# Patient Record
Sex: MALE | Race: WHITE | NOT HISPANIC OR LATINO | Employment: OTHER | URBAN - METROPOLITAN AREA
[De-identification: names, ages, dates, MRNs, and addresses within clinical notes are randomized per-mention and may not be internally consistent; named-entity substitution may affect disease eponyms.]

---

## 2022-07-03 ENCOUNTER — HOSPITAL ENCOUNTER (EMERGENCY)
Facility: HOSPITAL | Age: 57
Discharge: HOME | End: 2022-07-03
Attending: FAMILY MEDICINE
Payer: MEDICAID

## 2022-07-03 VITALS
RESPIRATION RATE: 18 BRPM | BODY MASS INDEX: 25.05 KG/M2 | HEART RATE: 74 BPM | TEMPERATURE: 98 F | OXYGEN SATURATION: 97 % | SYSTOLIC BLOOD PRESSURE: 157 MMHG | WEIGHT: 175 LBS | HEIGHT: 70 IN | DIASTOLIC BLOOD PRESSURE: 108 MMHG

## 2022-07-03 DIAGNOSIS — R10.9 FLANK PAIN: Primary | ICD-10-CM

## 2022-07-03 DIAGNOSIS — N20.0 NEPHROLITHIASIS: ICD-10-CM

## 2022-07-03 LAB
ALBUMIN SERPL-MCNC: 4.1 GM/DL (ref 3.5–5)
ALBUMIN/GLOB SERPL: 1.5 RATIO (ref 1.1–2)
ALP SERPL-CCNC: 53 UNIT/L (ref 40–150)
ALT SERPL-CCNC: 12 UNIT/L (ref 0–55)
APPEARANCE UR: CLEAR
AST SERPL-CCNC: 15 UNIT/L (ref 5–34)
BACTERIA #/AREA URNS AUTO: ABNORMAL /HPF
BASOPHILS # BLD AUTO: 0.05 X10(3)/MCL (ref 0–0.2)
BASOPHILS NFR BLD AUTO: 0.5 %
BILIRUB UR QL STRIP.AUTO: NEGATIVE MG/DL
BILIRUBIN DIRECT+TOT PNL SERPL-MCNC: 0.5 MG/DL
BUN SERPL-MCNC: 19.5 MG/DL (ref 8.4–25.7)
CALCIUM SERPL-MCNC: 9.7 MG/DL (ref 8.4–10.2)
CHLORIDE SERPL-SCNC: 107 MMOL/L (ref 98–107)
CO2 SERPL-SCNC: 28 MMOL/L (ref 22–29)
COLOR UR AUTO: ABNORMAL
CREAT SERPL-MCNC: 0.88 MG/DL (ref 0.73–1.18)
EOSINOPHIL # BLD AUTO: 0.23 X10(3)/MCL (ref 0–0.9)
EOSINOPHIL NFR BLD AUTO: 2.5 %
ERYTHROCYTE [DISTWIDTH] IN BLOOD BY AUTOMATED COUNT: 12.4 % (ref 11.5–17)
GLOBULIN SER-MCNC: 2.8 GM/DL (ref 2.4–3.5)
GLUCOSE SERPL-MCNC: 102 MG/DL (ref 74–100)
GLUCOSE UR QL STRIP.AUTO: NORMAL MG/DL
HCT VFR BLD AUTO: 45.9 % (ref 42–52)
HGB BLD-MCNC: 15.1 GM/DL (ref 14–18)
HYALINE CASTS #/AREA URNS LPF: ABNORMAL /LPF
IMM GRANULOCYTES # BLD AUTO: 0.03 X10(3)/MCL (ref 0–0.04)
IMM GRANULOCYTES NFR BLD AUTO: 0.3 %
KETONES UR QL STRIP.AUTO: NEGATIVE MG/DL
LEUKOCYTE ESTERASE UR QL STRIP.AUTO: NEGATIVE UNIT/L
LYMPHOCYTES # BLD AUTO: 1.65 X10(3)/MCL (ref 0.6–4.6)
LYMPHOCYTES NFR BLD AUTO: 18 %
MCH RBC QN AUTO: 28.8 PG (ref 27–31)
MCHC RBC AUTO-ENTMCNC: 32.9 MG/DL (ref 33–36)
MCV RBC AUTO: 87.4 FL (ref 80–94)
MONOCYTES # BLD AUTO: 0.57 X10(3)/MCL (ref 0.1–1.3)
MONOCYTES NFR BLD AUTO: 6.2 %
NEUTROPHILS # BLD AUTO: 6.7 X10(3)/MCL (ref 2.1–9.2)
NEUTROPHILS NFR BLD AUTO: 72.5 %
NITRITE UR QL STRIP.AUTO: NEGATIVE
NRBC BLD AUTO-RTO: 0 %
PH UR STRIP.AUTO: 6.5 [PH]
PLATELET # BLD AUTO: 234 X10(3)/MCL (ref 130–400)
PMV BLD AUTO: 10 FL (ref 7.4–10.4)
POTASSIUM SERPL-SCNC: 4.4 MMOL/L (ref 3.5–5.1)
PROT SERPL-MCNC: 6.9 GM/DL (ref 6.4–8.3)
PROT UR QL STRIP.AUTO: NEGATIVE MG/DL
RBC # BLD AUTO: 5.25 X10(6)/MCL (ref 4.7–6.1)
RBC #/AREA URNS AUTO: ABNORMAL /HPF
RBC UR QL AUTO: ABNORMAL UNIT/L
SODIUM SERPL-SCNC: 143 MMOL/L (ref 136–145)
SP GR UR STRIP.AUTO: 1.02
SQUAMOUS #/AREA URNS LPF: ABNORMAL /HPF
UROBILINOGEN UR STRIP-ACNC: NORMAL MG/DL
WBC # SPEC AUTO: 9.2 X10(3)/MCL (ref 4.5–11.5)
WBC #/AREA URNS AUTO: ABNORMAL /HPF

## 2022-07-03 PROCEDURE — 36415 COLL VENOUS BLD VENIPUNCTURE: CPT | Performed by: FAMILY MEDICINE

## 2022-07-03 PROCEDURE — 63600175 PHARM REV CODE 636 W HCPCS: Performed by: FAMILY MEDICINE

## 2022-07-03 PROCEDURE — 80053 COMPREHEN METABOLIC PANEL: CPT | Performed by: FAMILY MEDICINE

## 2022-07-03 PROCEDURE — 85025 COMPLETE CBC W/AUTO DIFF WBC: CPT | Performed by: FAMILY MEDICINE

## 2022-07-03 PROCEDURE — 25000003 PHARM REV CODE 250: Performed by: FAMILY MEDICINE

## 2022-07-03 PROCEDURE — 96374 THER/PROPH/DIAG INJ IV PUSH: CPT

## 2022-07-03 PROCEDURE — 96361 HYDRATE IV INFUSION ADD-ON: CPT

## 2022-07-03 PROCEDURE — 99285 EMERGENCY DEPT VISIT HI MDM: CPT | Mod: 25

## 2022-07-03 PROCEDURE — 81001 URINALYSIS AUTO W/SCOPE: CPT | Performed by: FAMILY MEDICINE

## 2022-07-03 RX ORDER — KETOROLAC TROMETHAMINE 30 MG/ML
30 INJECTION, SOLUTION INTRAMUSCULAR; INTRAVENOUS
Status: COMPLETED | OUTPATIENT
Start: 2022-07-03 | End: 2022-07-03

## 2022-07-03 RX ORDER — ONDANSETRON 4 MG/1
4 TABLET, ORALLY DISINTEGRATING ORAL EVERY 6 HOURS PRN
Qty: 10 TABLET | Refills: 0 | Status: SHIPPED | OUTPATIENT
Start: 2022-07-03 | End: 2022-07-08

## 2022-07-03 RX ORDER — BACLOFEN 10 MG/1
10 TABLET ORAL 3 TIMES DAILY
Qty: 30 TABLET | Refills: 0 | Status: ON HOLD | OUTPATIENT
Start: 2022-07-03 | End: 2022-07-13

## 2022-07-03 RX ORDER — KETOROLAC TROMETHAMINE 10 MG/1
10 TABLET, FILM COATED ORAL EVERY 6 HOURS PRN
Qty: 20 TABLET | Refills: 0 | Status: SHIPPED | OUTPATIENT
Start: 2022-07-03 | End: 2022-07-08

## 2022-07-03 RX ORDER — ONDANSETRON 4 MG/1
4 TABLET, ORALLY DISINTEGRATING ORAL EVERY 6 HOURS PRN
Qty: 10 TABLET | Refills: 0 | Status: SHIPPED | OUTPATIENT
Start: 2022-07-03 | End: 2022-07-03 | Stop reason: SDUPTHER

## 2022-07-03 RX ADMIN — SODIUM CHLORIDE 1000 ML: 9 INJECTION, SOLUTION INTRAVENOUS at 02:07

## 2022-07-03 RX ADMIN — KETOROLAC TROMETHAMINE 30 MG: 30 INJECTION, SOLUTION INTRAMUSCULAR at 02:07

## 2022-07-03 NOTE — ED PROVIDER NOTES
Encounter Date: 7/3/2022       History     Chief Complaint   Patient presents with    Flank Pain     Patient reports flank pain started this am worse with movement     A 57-year-old gentleman presents emergency room with complaints of acute onset of left flank pain that began around 720 this morning while in breakfast.  Patient reports a history of kidney stones in the past, last of that approximately 15 years prior.  Patient describes the pain as sharp/crampy pain with radiation to the left gluteal region.  No fever or chills.  Mild nausea.  No dysuria or hematuria.        Review of patient's allergies indicates:  No Known Allergies  No past medical history on file.  No past surgical history on file.  No family history on file.     Review of Systems   Constitutional: Negative for chills, fatigue and fever.   HENT: Negative for ear pain, rhinorrhea and sore throat.    Eyes: Negative for photophobia and pain.   Respiratory: Negative for cough, shortness of breath and wheezing.    Cardiovascular: Negative for chest pain.   Gastrointestinal: Negative for abdominal pain, diarrhea, nausea and vomiting.   Genitourinary: Positive for flank pain. Negative for dysuria.   Musculoskeletal: Positive for back pain.   Neurological: Negative for dizziness, weakness and headaches.   All other systems reviewed and are negative.      Physical Exam     Initial Vitals [07/03/22 1336]   BP Pulse Resp Temp SpO2   (!) 156/103 70 16 97.7 °F (36.5 °C) 99 %      MAP       --         Physical Exam    Nursing note and vitals reviewed.  Constitutional: He appears well-developed and well-nourished.   HENT:   Head: Normocephalic and atraumatic.   Eyes: EOM are normal. Pupils are equal, round, and reactive to light.   Neck: Neck supple.   Normal range of motion.  Cardiovascular: Normal rate, regular rhythm, normal heart sounds and intact distal pulses. Exam reveals no gallop and no friction rub.    No murmur heard.  Pulmonary/Chest: Breath sounds  normal. No respiratory distress.   Abdominal: Abdomen is soft. Bowel sounds are normal. He exhibits no distension. There is no abdominal tenderness.   Musculoskeletal:         General: Normal range of motion.      Cervical back: Normal range of motion and neck supple.     Neurological: He is alert and oriented to person, place, and time. He has normal strength.   Skin: Skin is warm and dry.   Psychiatric: He has a normal mood and affect. His behavior is normal. Judgment and thought content normal.         ED Course   Procedures  Labs Reviewed   COMPREHENSIVE METABOLIC PANEL - Abnormal; Notable for the following components:       Result Value    Glucose Level 102 (*)     All other components within normal limits   URINALYSIS, REFLEX TO URINE CULTURE - Abnormal; Notable for the following components:    Color, UA Light-Yellow (*)     Blood, UA Trace (*)     Squamous Epithelial Cells, UA Trace (*)     All other components within normal limits   CBC WITH DIFFERENTIAL - Abnormal; Notable for the following components:    MCHC 32.9 (*)     All other components within normal limits   CBC W/ AUTO DIFFERENTIAL    Narrative:     The following orders were created for panel order CBC Auto Differential.  Procedure                               Abnormality         Status                     ---------                               -----------         ------                     CBC with Differential[[id number]]        Abnormal            Final result                 Please view results for these tests on the individual orders.   EXTRA TUBES    Narrative:     The following orders were created for panel order EXTRA TUBES.  Procedure                               Abnormality         Status                     ---------                               -----------         ------                     Light Blue Top Hold[[id number]]                              In process                 Gold Top Hold[[id number]]                                     In process                   Please view results for these tests on the individual orders.   LIGHT BLUE TOP HOLD   GOLD TOP HOLD          Imaging Results          CT Abdomen Pelvis  Without Contrast (Final result)  Result time 07/03/22 16:35:59    Final result by Chris Avina MD (07/03/22 16:35:59)                 Impression:      Motion degraded exam.    In spite of this limitation:    1. Bilateral nonobstructive renal stones.  No hydronephrosis.  2. Cholelithiasis.  3. Colonic diverticulosis.  No diverticulitis.      Electronically signed by: Chris Avina  Date:    07/03/2022  Time:    16:35             Narrative:    EXAMINATION:  CT ABDOMEN PELVIS WITHOUT CONTRAST    CLINICAL HISTORY:  Flank pain, kidney stone suspected;    TECHNIQUE:  CT of the abdomen and pelvis WITHOUT intravenous contrast. The abdomen and pelvis were scanned utilizing a multidetector helical scanner from the diaphragm to the lesser trochanter without the administration of intravenous or oral contrast. Coronal and sagittal reformations were obtained.    Automatic exposure control was utilized to limit radiation dose.    Radiation Dose:    Total DLP: 320 mGy*cm    COMPARISON:  None.    FINDINGS:  Lack of intravenous contrast limits sensitivity for detection of solid organ and vascular pathology.    LOWER THORAX: Atelectatic changes, otherwise lung bases clear.  No pericardial or pleural effusions.    HEPATOBILIARY: Hepatic dome not captured in its entirety.  Scattered calcifications.  Right hepatic 2 cm cyst of simple fluid attenuation.  Otherwise no focal hepatic lesions.  No biliary ductal dilatation. Calcified 0.6 cm stone at the neck.    SPLEEN: Size within normal limits.  Scattered calcifications suggest prior granulomatous disease.    PANCREAS: No focal masses or ductal dilatation.    ADRENALS: No adrenal nodules.    KIDNEYS/URETERS: Few nonobstructive renal stones bilaterally, the largest is at the right lower pole measures 0.5 cm.   No hydronephrosis.  Minimal nonspecific perinephric stranding bilaterally.    PELVIC ORGANS/BLADDER: Prostatic calcifications.    PERITONEUM / RETROPERITONEUM: No free intraperitoneal air. No free fluid.    LYMPH NODES: No lymphadenopathy.    VESSELS: Scattered atherosclerotic vascular calcifications of a nonaneurysmal abdominal aorta and its iliac branches.    GI TRACT: No distention or wall thickening. Colonic diverticulosis.  No diverticulitis.  Appendix not visualized.  No pericecal inflammatory changes.    BONES AND SOFT TISSUES: Small fat containing bilateral inguinal hernias.  Otherwise soft tissues within normal limits.  Healed fracture deformity of the sternum.  No bony destructive lesions.  No acute bony pathology.                                 Medications   ketorolac injection 30 mg (30 mg Intravenous Given 7/3/22 1450)   sodium chloride 0.9% bolus 1,000 mL (0 mLs Intravenous Stopped 7/3/22 1550)                 ED Course as of 07/03/22 1656   Sun Jul 03, 2022   1651 Patient currently feeling improved.  Discussed with patient regarding findings.  Will treat symptomatically, possibly related to musculoskeletal pain.  ER precautions given for any acute worsening. [MW]      ED Course User Index  [MW] Trey Mayes MD             Clinical Impression:   Final diagnoses:  [R10.9] Flank pain (Primary)  [N20.0] Nephrolithiasis          ED Disposition Condition    Discharge Stable        ED Prescriptions     Medication Sig Dispense Start Date End Date Auth. Provider    ketorolac (TORADOL) 10 mg tablet Take 1 tablet (10 mg total) by mouth every 6 (six) hours as needed for Pain. 20 tablet 7/3/2022 7/8/2022 Trey Mayes MD    ondansetron (ZOFRAN-ODT) 4 MG TbDL  (Status: Discontinued) Take 1 tablet (4 mg total) by mouth every 6 (six) hours as needed (nausea vomiting). 10 tablet 7/3/2022 7/3/2022 Trey Mayes MD    baclofen (LIORESAL) 10 MG tablet Take 1 tablet (10 mg total) by mouth 3  (three) times daily. for 10 days 30 tablet 7/3/2022 7/13/2022 Trey Mayes MD    ondansetron (ZOFRAN-ODT) 4 MG TbDL Take 1 tablet (4 mg total) by mouth every 6 (six) hours as needed (nausea vomiting). 10 tablet 7/3/2022 7/8/2022 Trey Mayes MD        Follow-up Information     Follow up With Specialties Details Why Contact Info    [facility] - Emergency Dept Emergency Medicine  As needed, If symptoms worsen [address] [facility] [address] [phone]    Primary Care Physician  In 5 days             Trey Mayes MD  07/03/22 8069       Trey Mayes MD  07/03/22 3549

## 2022-07-06 ENCOUNTER — HOSPITAL ENCOUNTER (EMERGENCY)
Facility: HOSPITAL | Age: 57
Discharge: HOME | End: 2022-07-06
Attending: EMERGENCY MEDICINE
Payer: MEDICAID

## 2022-07-06 VITALS
DIASTOLIC BLOOD PRESSURE: 64 MMHG | HEART RATE: 71 BPM | BODY MASS INDEX: 25.18 KG/M2 | HEIGHT: 69 IN | WEIGHT: 170 LBS | TEMPERATURE: 98 F | OXYGEN SATURATION: 98 % | SYSTOLIC BLOOD PRESSURE: 123 MMHG | RESPIRATION RATE: 16 BRPM

## 2022-07-06 DIAGNOSIS — M54.9 MUSCULOSKELETAL BACK PAIN: ICD-10-CM

## 2022-07-06 DIAGNOSIS — M54.42 CHRONIC LEFT-SIDED LOW BACK PAIN WITH LEFT-SIDED SCIATICA: Primary | ICD-10-CM

## 2022-07-06 DIAGNOSIS — N20.0 RIGHT RENAL STONE: ICD-10-CM

## 2022-07-06 DIAGNOSIS — G89.29 CHRONIC LEFT-SIDED LOW BACK PAIN WITH LEFT-SIDED SCIATICA: Primary | ICD-10-CM

## 2022-07-06 PROCEDURE — 96361 HYDRATE IV INFUSION ADD-ON: CPT

## 2022-07-06 PROCEDURE — 99284 EMERGENCY DEPT VISIT MOD MDM: CPT | Mod: 25

## 2022-07-06 PROCEDURE — 96374 THER/PROPH/DIAG INJ IV PUSH: CPT

## 2022-07-06 PROCEDURE — 25000003 PHARM REV CODE 250: Performed by: NURSE PRACTITIONER

## 2022-07-06 PROCEDURE — 96375 TX/PRO/DX INJ NEW DRUG ADDON: CPT

## 2022-07-06 PROCEDURE — 63600175 PHARM REV CODE 636 W HCPCS: Performed by: NURSE PRACTITIONER

## 2022-07-06 PROCEDURE — 63600175 PHARM REV CODE 636 W HCPCS

## 2022-07-06 RX ORDER — KETOROLAC TROMETHAMINE 30 MG/ML
30 INJECTION, SOLUTION INTRAMUSCULAR; INTRAVENOUS ONCE
Status: COMPLETED | OUTPATIENT
Start: 2022-07-06 | End: 2022-07-06

## 2022-07-06 RX ORDER — CYCLOBENZAPRINE HCL 10 MG
5 TABLET ORAL 3 TIMES DAILY PRN
Qty: 8 TABLET | Refills: 0 | Status: SHIPPED | OUTPATIENT
Start: 2022-07-06 | End: 2022-07-11

## 2022-07-06 RX ORDER — KETOROLAC TROMETHAMINE 30 MG/ML
INJECTION, SOLUTION INTRAMUSCULAR; INTRAVENOUS
Status: COMPLETED
Start: 2022-07-06 | End: 2022-07-06

## 2022-07-06 RX ORDER — HYDROCODONE BITARTRATE AND ACETAMINOPHEN 7.5; 325 MG/1; MG/1
1 TABLET ORAL EVERY 6 HOURS PRN
Qty: 15 TABLET | Refills: 0 | Status: ON HOLD | OUTPATIENT
Start: 2022-07-06

## 2022-07-06 RX ORDER — SODIUM CHLORIDE 9 MG/ML
1000 INJECTION, SOLUTION INTRAVENOUS
Status: COMPLETED | OUTPATIENT
Start: 2022-07-06 | End: 2022-07-06

## 2022-07-06 RX ORDER — DEXAMETHASONE SODIUM PHOSPHATE 4 MG/ML
8 INJECTION, SOLUTION INTRA-ARTICULAR; INTRALESIONAL; INTRAMUSCULAR; INTRAVENOUS; SOFT TISSUE
Status: COMPLETED | OUTPATIENT
Start: 2022-07-06 | End: 2022-07-06

## 2022-07-06 RX ADMIN — SODIUM CHLORIDE 1000 ML: 9 INJECTION, SOLUTION INTRAVENOUS at 10:07

## 2022-07-06 RX ADMIN — DEXAMETHASONE SODIUM PHOSPHATE 8 MG: 4 INJECTION, SOLUTION INTRAMUSCULAR; INTRAVENOUS at 10:07

## 2022-07-06 RX ADMIN — KETOROLAC TROMETHAMINE 30 MG: 30 INJECTION, SOLUTION INTRAMUSCULAR; INTRAVENOUS at 11:07

## 2022-07-06 NOTE — ED PROVIDER NOTES
Encounter Date: 7/6/2022       History     Chief Complaint   Patient presents with    Back Pain     Complaining of lower left back pain radiating down left leg x3 days     Patient states left lower back pain that radiates down his left leg x 3 days. Denies any injury or trauma. States that he was recently treated for a right sided renal stone at [facility] 3 days ago. States a hx. Of chronic back pain. Denies any loss of bladder or bowel or any saddle anesthesia.         Review of patient's allergies indicates:  No Known Allergies  History reviewed. No pertinent past medical history.  History reviewed. No pertinent surgical history.  History reviewed. No pertinent family history.     Review of Systems   Constitutional: Negative.  Negative for chills and fever.   HENT: Negative.    Eyes: Negative.    Respiratory: Negative.    Cardiovascular: Negative.    Gastrointestinal: Negative.  Negative for abdominal pain, nausea and vomiting.   Endocrine: Negative.    Genitourinary: Negative.  Negative for dysuria and hematuria.   Musculoskeletal: Positive for back pain.   Skin: Negative.  Negative for rash.   Allergic/Immunologic: Negative.    Neurological: Negative.  Negative for weakness.   Hematological: Negative.    Psychiatric/Behavioral: Negative.    All other systems reviewed and are negative.      Physical Exam     Initial Vitals [07/06/22 0912]   BP Pulse Resp Temp SpO2   (!) 160/98 77 18 98.2 °F (36.8 °C) 98 %      MAP       --         Physical Exam    Nursing note and vitals reviewed.  Constitutional: He appears well-developed and well-nourished. No distress.   HENT:   Head: Normocephalic and atraumatic.   Mouth/Throat: Oropharynx is clear and moist.   Eyes: Conjunctivae and EOM are normal. Pupils are equal, round, and reactive to light.   Neck: Neck supple.   Normal range of motion.  Cardiovascular: Normal rate, regular rhythm, normal heart sounds and intact distal pulses.   Pulmonary/Chest: Breath sounds normal. No  respiratory distress.   Abdominal: Abdomen is soft. He exhibits no distension. There is no abdominal tenderness.   Musculoskeletal:         General: No edema. Normal range of motion.      Cervical back: Normal, normal range of motion and neck supple. No tenderness or bony tenderness. Normal range of motion.      Thoracic back: Normal. No tenderness or bony tenderness. Normal range of motion.      Lumbar back: Tenderness (mild left sided paravertebral tenderness to palpation. ) present. No bony tenderness. Normal range of motion.     Neurological: He is alert and oriented to person, place, and time. He has normal strength. GCS score is 15. GCS eye subscore is 4. GCS verbal subscore is 5. GCS motor subscore is 6.   Skin: Skin is warm and dry. No rash noted.   Psychiatric: He has a normal mood and affect. Thought content normal.         ED Course   Procedures  Labs Reviewed - No data to display       Imaging Results    None          Medications   0.9%  NaCl infusion (1,000 mLs Intravenous New Bag 7/6/22 1030)   ketorolac injection 30 mg (30 mg Intravenous Given 7/6/22 1130)   dexamethasone injection 8 mg (8 mg Intravenous Given 7/6/22 1030)     Medical Decision Making:   Initial Assessment:   Patient is awake, alert, nontoxic appearing, and neurovascular intact.   Differential Diagnosis:   Back pain, sciatica, renal stone  ED Management:  Reviewed patient's lab work and CT scan from 2 days ago. Labs were unremarkable. CT scan of abdomen and pelvis shows a right lower pole renal stone and bilateral non obstructing renal stones. Patient's exam and symptoms today are more inline with patient's chronic back pain and sciatica. Will treat for back pain and sciatica and have patient f/u with his PCP and urology as already referred.                      Clinical Impression:   Final diagnoses:  [M54.42, G89.29] Chronic left-sided low back pain with left-sided sciatica (Primary)  [M54.9] Musculoskeletal back pain  [N20.0] Right  renal stone          ED Disposition Condition    Discharge Stable        ED Prescriptions     Medication Sig Dispense Start Date End Date Auth. Provider    HYDROcodone-acetaminophen (NORCO) 7.5-325 mg per tablet Take 1 tablet by mouth every 6 (six) hours as needed for Pain. 15 tablet 7/6/2022  SEAN Doyle    cyclobenzaprine (FLEXERIL) 10 MG tablet Take 0.5 tablets (5 mg total) by mouth 3 (three) times daily as needed for Muscle spasms. 8 tablet 7/6/2022 7/11/2022 SEAN Doyle        Follow-up Information     Follow up With Specialties Details Why Contact Info    Casi Delgado MD  In 2 days  [address] [facility] [id number] [phone]      Geneva Hawk MD Urology In 1 week  [address] [address]  [phone]             SEAN Doyle  07/06/22 7981

## 2022-07-08 DIAGNOSIS — M54.9 BACK PAIN, UNSPECIFIED BACK LOCATION, UNSPECIFIED BACK PAIN LATERALITY, UNSPECIFIED CHRONICITY: Primary | ICD-10-CM

## 2022-07-18 DIAGNOSIS — N20.0 KIDNEY STONE ON LEFT SIDE: Primary | ICD-10-CM

## 2022-07-19 ENCOUNTER — HOSPITAL ENCOUNTER (EMERGENCY)
Facility: HOSPITAL | Age: 57
Discharge: HOME | End: 2022-07-19
Attending: STUDENT IN AN ORGANIZED HEALTH CARE EDUCATION/TRAINING PROGRAM
Payer: MEDICAID

## 2022-07-19 VITALS
DIASTOLIC BLOOD PRESSURE: 108 MMHG | SYSTOLIC BLOOD PRESSURE: 144 MMHG | TEMPERATURE: 98 F | OXYGEN SATURATION: 98 % | HEIGHT: 70 IN | HEART RATE: 84 BPM | WEIGHT: 172.63 LBS | BODY MASS INDEX: 24.71 KG/M2 | RESPIRATION RATE: 16 BRPM

## 2022-07-19 DIAGNOSIS — G89.29 CHRONIC LOW BACK PAIN WITH LEFT-SIDED SCIATICA, UNSPECIFIED BACK PAIN LATERALITY: ICD-10-CM

## 2022-07-19 DIAGNOSIS — R10.9 LEFT FLANK PAIN: Primary | ICD-10-CM

## 2022-07-19 DIAGNOSIS — M54.42 CHRONIC LOW BACK PAIN WITH LEFT-SIDED SCIATICA, UNSPECIFIED BACK PAIN LATERALITY: ICD-10-CM

## 2022-07-19 LAB
ALBUMIN SERPL-MCNC: 3.7 GM/DL (ref 3.5–5)
ALBUMIN/GLOB SERPL: 1.5 RATIO (ref 1.1–2)
ALP SERPL-CCNC: 45 UNIT/L (ref 40–150)
ALT SERPL-CCNC: 21 UNIT/L (ref 0–55)
APPEARANCE UR: CLEAR
AST SERPL-CCNC: 16 UNIT/L (ref 5–34)
BACTERIA #/AREA URNS AUTO: ABNORMAL /HPF
BASOPHILS # BLD AUTO: 0.04 X10(3)/MCL (ref 0–0.2)
BASOPHILS NFR BLD AUTO: 0.6 %
BILIRUB UR QL STRIP.AUTO: NEGATIVE MG/DL
BILIRUBIN DIRECT+TOT PNL SERPL-MCNC: 1 MG/DL
BUN SERPL-MCNC: 10.5 MG/DL (ref 8.4–25.7)
CALCIUM SERPL-MCNC: 8.7 MG/DL (ref 8.4–10.2)
CAOX CRY URNS QL MICRO: ABNORMAL /HPF
CHLORIDE SERPL-SCNC: 108 MMOL/L (ref 98–107)
CO2 SERPL-SCNC: 23 MMOL/L (ref 22–29)
COLOR UR AUTO: ABNORMAL
CREAT SERPL-MCNC: 0.74 MG/DL (ref 0.73–1.18)
EOSINOPHIL # BLD AUTO: 0.28 X10(3)/MCL (ref 0–0.9)
EOSINOPHIL NFR BLD AUTO: 3.9 %
ERYTHROCYTE [DISTWIDTH] IN BLOOD BY AUTOMATED COUNT: 12.2 % (ref 11.5–17)
GLOBULIN SER-MCNC: 2.5 GM/DL (ref 2.4–3.5)
GLUCOSE SERPL-MCNC: 102 MG/DL (ref 74–100)
GLUCOSE UR QL STRIP.AUTO: NORMAL MG/DL
HCT VFR BLD AUTO: 44.7 % (ref 42–52)
HGB BLD-MCNC: 14.6 GM/DL (ref 14–18)
HYALINE CASTS #/AREA URNS LPF: ABNORMAL /LPF
IMM GRANULOCYTES # BLD AUTO: 0.03 X10(3)/MCL (ref 0–0.04)
IMM GRANULOCYTES NFR BLD AUTO: 0.4 %
KETONES UR QL STRIP.AUTO: NEGATIVE MG/DL
LEUKOCYTE ESTERASE UR QL STRIP.AUTO: NEGATIVE UNIT/L
LYMPHOCYTES # BLD AUTO: 1.62 X10(3)/MCL (ref 0.6–4.6)
LYMPHOCYTES NFR BLD AUTO: 22.4 %
MCH RBC QN AUTO: 28.1 PG (ref 27–31)
MCHC RBC AUTO-ENTMCNC: 32.7 MG/DL (ref 33–36)
MCV RBC AUTO: 86 FL (ref 80–94)
MONOCYTES # BLD AUTO: 0.43 X10(3)/MCL (ref 0.1–1.3)
MONOCYTES NFR BLD AUTO: 6 %
MUCOUS THREADS URNS QL MICRO: ABNORMAL /LPF
NEUTROPHILS # BLD AUTO: 4.8 X10(3)/MCL (ref 2.1–9.2)
NEUTROPHILS NFR BLD AUTO: 66.7 %
NITRITE UR QL STRIP.AUTO: NEGATIVE
NRBC BLD AUTO-RTO: 0 %
PH UR STRIP.AUTO: 6 [PH]
PLATELET # BLD AUTO: 241 X10(3)/MCL (ref 130–400)
PMV BLD AUTO: 9.6 FL (ref 7.4–10.4)
POTASSIUM SERPL-SCNC: 3.7 MMOL/L (ref 3.5–5.1)
PROT SERPL-MCNC: 6.2 GM/DL (ref 6.4–8.3)
PROT UR QL STRIP.AUTO: NEGATIVE MG/DL
RBC # BLD AUTO: 5.2 X10(6)/MCL (ref 4.7–6.1)
RBC #/AREA URNS AUTO: ABNORMAL /HPF
RBC UR QL AUTO: ABNORMAL UNIT/L
SODIUM SERPL-SCNC: 138 MMOL/L (ref 136–145)
SP GR UR STRIP.AUTO: 1.02
SQUAMOUS #/AREA URNS LPF: ABNORMAL /HPF
UROBILINOGEN UR STRIP-ACNC: NORMAL MG/DL
WBC # SPEC AUTO: 7.2 X10(3)/MCL (ref 4.5–11.5)
WBC #/AREA URNS AUTO: ABNORMAL /HPF

## 2022-07-19 PROCEDURE — 36415 COLL VENOUS BLD VENIPUNCTURE: CPT | Performed by: PHYSICIAN ASSISTANT

## 2022-07-19 PROCEDURE — 81001 URINALYSIS AUTO W/SCOPE: CPT | Performed by: PHYSICIAN ASSISTANT

## 2022-07-19 PROCEDURE — 80053 COMPREHEN METABOLIC PANEL: CPT | Performed by: PHYSICIAN ASSISTANT

## 2022-07-19 PROCEDURE — 85025 COMPLETE CBC W/AUTO DIFF WBC: CPT | Performed by: PHYSICIAN ASSISTANT

## 2022-07-19 PROCEDURE — 99283 EMERGENCY DEPT VISIT LOW MDM: CPT | Mod: 25

## 2022-07-19 RX ORDER — TAMSULOSIN HYDROCHLORIDE 0.4 MG/1
0.4 CAPSULE ORAL DAILY
Qty: 30 CAPSULE | Refills: 0 | Status: SHIPPED | OUTPATIENT
Start: 2022-07-19 | End: 2022-08-01 | Stop reason: DRUGHIGH

## 2022-07-19 NOTE — ED PROVIDER NOTES
Encounter Date: 7/19/2022       History     Chief Complaint   Patient presents with    Flank Pain     Lt flank pain. Pt reports told by khurram costa he had kidney stones on the right side. Reports blood in urine.     58 yo M w/ PMHx significant for smoking, chronic back pain & recurrent nephrolithiasis presents to ED c/o ongoing flank pain w/ intermittent hematuria. Patient seen in different EDs on 7/3, 7/5 & 7/6 & discharged w/ flomax & pain meds. Reports that he has completed flomax rx, but continues to experience symptoms. Currently taking percocet for pain. Reports that he is here this AM because he wants to have kidneys checked due to the stress that is being put on them. Has not yet followed up w/ urologist. Does report radiation of pain in LLE. Denies saddle anesthesia, incontinence, bowel/bladder retention, N/V, abdominal pain, F/C, dysuria. Mildly hypertensive on arrival, vitals otherwise stable w/ NAD.        Review of patient's allergies indicates:  No Known Allergies  No past medical history on file.  Past Surgical History:   Procedure Laterality Date    APPENDECTOMY       No family history on file.  Social History     Tobacco Use    Smoking status: Current Every Day Smoker    Smokeless tobacco: Never Used     Review of Systems   Constitutional: Negative for chills, fatigue and fever.   Respiratory: Negative for cough and shortness of breath.    Cardiovascular: Negative for chest pain, palpitations and leg swelling.   Gastrointestinal: Negative for abdominal distention, abdominal pain, blood in stool, diarrhea, nausea and vomiting.   Endocrine: Negative for polydipsia, polyphagia and polyuria.   Genitourinary: Positive for flank pain and hematuria. Negative for decreased urine volume, difficulty urinating, dysuria, enuresis, frequency, genital sores, penile discharge, penile pain, scrotal swelling, testicular pain and urgency.   Musculoskeletal: Positive for back pain. Negative for arthralgias,  joint swelling and myalgias.   Skin: Negative for color change, pallor and rash.   Neurological: Negative for dizziness, syncope and headaches.   All other systems reviewed and are negative.      Physical Exam     Initial Vitals [07/19/22 0638]   BP Pulse Resp Temp SpO2   (!) 166/104 83 18 98.2 °F (36.8 °C) 97 %      MAP       --         Physical Exam    Nursing note and vitals reviewed.  Constitutional: He appears well-developed and well-nourished. He is not diaphoretic. No distress.   HENT:   Head: Normocephalic and atraumatic.   Eyes: Conjunctivae are normal. Pupils are equal, round, and reactive to light.   Neck: Neck supple.   Normal range of motion.  Cardiovascular: Normal rate, regular rhythm, normal heart sounds and intact distal pulses. Exam reveals no gallop and no friction rub.    No murmur heard.  Pulmonary/Chest: Breath sounds normal. No respiratory distress. He has no wheezes. He has no rhonchi. He has no rales.   Abdominal: Abdomen is soft. Bowel sounds are normal. He exhibits no distension and no mass. There is no abdominal tenderness. There is no rebound and no guarding.   Musculoskeletal:         General: No edema.      Cervical back: Normal, normal range of motion and neck supple.      Thoracic back: Normal.      Lumbar back: Tenderness present. No spasms or bony tenderness. Positive left straight leg raise test. Negative right straight leg raise test.      Comments: No significant CVA tenderness     Neurological: He is alert and oriented to person, place, and time. He has normal strength and normal reflexes. No cranial nerve deficit or sensory deficit.   Skin: Skin is warm and dry.   Psychiatric: He has a normal mood and affect.         ED Course   Procedures  Labs Reviewed   COMPREHENSIVE METABOLIC PANEL - Abnormal; Notable for the following components:       Result Value    Chloride 108 (*)     Glucose Level 102 (*)     Protein Total 6.2 (*)     All other components within normal limits    URINALYSIS, REFLEX TO URINE CULTURE - Abnormal; Notable for the following components:    Color, UA Light-Yellow (*)     Blood, UA Trace (*)     Mucous, UA Occ (*)     Calcium Oxalate Crystals, UA Trace (*)     All other components within normal limits   CBC WITH DIFFERENTIAL - Abnormal; Notable for the following components:    MCHC 32.7 (*)     All other components within normal limits   CBC W/ AUTO DIFFERENTIAL    Narrative:     The following orders were created for panel order CBC Auto Differential.  Procedure                               Abnormality         Status                     ---------                               -----------         ------                     CBC with Differential[[id number]]        Abnormal            Final result                 Please view results for these tests on the individual orders.   EXTRA TUBES    Narrative:     The following orders were created for panel order EXTRA TUBES.  Procedure                               Abnormality         Status                     ---------                               -----------         ------                     Light Blue Top Hold[[id number]]                              In process                 Gold Top Hold[[id number]]                                    In process                   Please view results for these tests on the individual orders.   LIGHT BLUE TOP HOLD   GOLD TOP HOLD          Imaging Results    None          Medications - No data to display  Medical Decision Making:   Clinical Tests:   Lab Tests: Ordered and Reviewed  Radiological Study: Reviewed  Today's workup wholly unremarkable. Scant occult blood on UA, but otherwise unremarkable w/ no signs of infection. Renal indices stable. CBC unremarkable. No significant CVA tenderness on physical exam. Based upon labs, physical exam & recent CT, no indication for further imaging at this time. Instructed patient to continue drinking adequate amounts of water & taking pain medicine  as directed. Will discharge w/ flomax. ED precautions given. Instructed to follow-up w/ both PCP & urology.       APC / Resident Notes:   I was not physically present during the history, exam or disposition of this patient. I was available at all times for consultation. (Jonny)                 Clinical Impression:   Final diagnoses:  [R10.9] Left flank pain (Primary)  [M54.42, G89.29] Chronic low back pain with left-sided sciatica, unspecified back pain laterality          ED Disposition Condition    Discharge Stable        ED Prescriptions     Medication Sig Dispense Start Date End Date Auth. Provider    tamsulosin (FLOMAX) 0.4 mg Cap Take 1 capsule (0.4 mg total) by mouth once daily. 30 capsule 7/19/2022 8/18/2022 YONY Ceballos        Follow-up Information     Follow up With Specialties Details Why Contact Info    Casi Delgado MD  In 1 week  [address] [facility] [address] [phone]      Urology   Keep scheduled appointment     [facility] - Emergency Dept Emergency Medicine  If symptoms worsen [address] [facility] [address] [phone]           YONY Ceballos  07/19/22 8226       Giovani Fuller MD  07/20/22 0056

## 2022-07-19 NOTE — DISCHARGE INSTRUCTIONS
Report to Emergency Department if symptoms return or worsen; [facility] - Medicine Clinic Within 1 to 2 days, It is important that you follow up with your primary care provider or specialist if indicated for further evaluation, workup, and treatment as necessary. The exam and treatment you received in Emergency Department was for an urgent problem and NOT INTENDED AS COMPLETE CARE. It is important that you FOLLOW UP with a doctor for ongoing care. If your symptoms become WORSE or you DO NOT IMPROVE and you are unable to reach your health care provider, you should RETURN to the Emergency Department. The Emergency Department provider has provided a PRELIMINARY INTERPRETATION of all your studies. A final interpretation may be done after you are discharged. If a change in your diagnosis or treatment is needed WE WILL CONTACT YOU. It is critical that we have a CURRENT PHONE NUMBER FOR YOU.

## 2022-08-01 ENCOUNTER — OFFICE VISIT (OUTPATIENT)
Dept: UROLOGY | Facility: CLINIC | Age: 57
End: 2022-08-01
Payer: MEDICAID

## 2022-08-01 VITALS
SYSTOLIC BLOOD PRESSURE: 132 MMHG | HEIGHT: 70 IN | OXYGEN SATURATION: 96 % | DIASTOLIC BLOOD PRESSURE: 97 MMHG | HEART RATE: 78 BPM | RESPIRATION RATE: 20 BRPM | WEIGHT: 164.44 LBS | TEMPERATURE: 98 F | BODY MASS INDEX: 23.54 KG/M2

## 2022-08-01 DIAGNOSIS — N20.0 KIDNEY STONE: ICD-10-CM

## 2022-08-01 DIAGNOSIS — N20.0 KIDNEY STONE ON LEFT SIDE: ICD-10-CM

## 2022-08-01 PROCEDURE — 3008F BODY MASS INDEX DOCD: CPT | Mod: CPTII,,, | Performed by: NURSE PRACTITIONER

## 2022-08-01 PROCEDURE — 3075F SYST BP GE 130 - 139MM HG: CPT | Mod: CPTII,,, | Performed by: NURSE PRACTITIONER

## 2022-08-01 PROCEDURE — 99204 OFFICE O/P NEW MOD 45 MIN: CPT | Mod: S$PBB,,, | Performed by: NURSE PRACTITIONER

## 2022-08-01 PROCEDURE — 99204 PR OFFICE/OUTPT VISIT, NEW, LEVL IV, 45-59 MIN: ICD-10-PCS | Mod: S$PBB,,, | Performed by: NURSE PRACTITIONER

## 2022-08-01 PROCEDURE — 3008F PR BODY MASS INDEX (BMI) DOCUMENTED: ICD-10-PCS | Mod: CPTII,,, | Performed by: NURSE PRACTITIONER

## 2022-08-01 PROCEDURE — 99213 OFFICE O/P EST LOW 20 MIN: CPT | Mod: PBBFAC | Performed by: NURSE PRACTITIONER

## 2022-08-01 PROCEDURE — 3075F PR MOST RECENT SYSTOLIC BLOOD PRESS GE 130-139MM HG: ICD-10-PCS | Mod: CPTII,,, | Performed by: NURSE PRACTITIONER

## 2022-08-01 PROCEDURE — 1159F PR MEDICATION LIST DOCUMENTED IN MEDICAL RECORD: ICD-10-PCS | Mod: CPTII,,, | Performed by: NURSE PRACTITIONER

## 2022-08-01 PROCEDURE — 1160F PR REVIEW ALL MEDS BY PRESCRIBER/CLIN PHARMACIST DOCUMENTED: ICD-10-PCS | Mod: CPTII,,, | Performed by: NURSE PRACTITIONER

## 2022-08-01 PROCEDURE — 1159F MED LIST DOCD IN RCRD: CPT | Mod: CPTII,,, | Performed by: NURSE PRACTITIONER

## 2022-08-01 PROCEDURE — 3080F DIAST BP >= 90 MM HG: CPT | Mod: CPTII,,, | Performed by: NURSE PRACTITIONER

## 2022-08-01 PROCEDURE — 1160F RVW MEDS BY RX/DR IN RCRD: CPT | Mod: CPTII,,, | Performed by: NURSE PRACTITIONER

## 2022-08-01 PROCEDURE — 3080F PR MOST RECENT DIASTOLIC BLOOD PRESSURE >= 90 MM HG: ICD-10-PCS | Mod: CPTII,,, | Performed by: NURSE PRACTITIONER

## 2022-08-01 RX ORDER — KETOROLAC TROMETHAMINE 10 MG/1
10 TABLET, FILM COATED ORAL EVERY 6 HOURS
Qty: 24 TABLET | Refills: 0 | Status: SHIPPED | OUTPATIENT
Start: 2022-08-01 | End: 2022-08-06

## 2022-08-01 RX ORDER — TAMSULOSIN HYDROCHLORIDE 0.4 MG/1
0.4 CAPSULE ORAL DAILY
Qty: 30 CAPSULE | Refills: 0 | Status: SHIPPED | OUTPATIENT
Start: 2022-08-01 | End: 2022-09-07 | Stop reason: SDUPTHER

## 2022-08-01 NOTE — PROGRESS NOTES
Chief Complaint:   Chief Complaint   Patient presents with    Nephrolithiasis     Left -sided       HPI:  Patient is 57-year-old male referred to Urology for kidney stones.  Patient was in the ED on 07/03/2022 diagnosis with kidney stone, placed on tamsulosin and Norco.  Today patient denies  Flank pain, groin pain, lower abdominal pain, evidence of passing stone, strainer use, patient denies urinary frequency, urgency, incontinence, retention, dysuria, gross hematuria.         Allergies:  Review of patient's allergies indicates:  No Known Allergies    Medications:  Current Outpatient Medications   Medication Sig Dispense Refill    HYDROcodone-acetaminophen (NORCO) 7.5-325 mg per tablet Take 1 tablet by mouth every 6 (six) hours as needed for Pain. 15 tablet 0    tamsulosin (FLOMAX) 0.4 mg Cap Take 1 capsule (0.4 mg total) by mouth once daily. 30 capsule 0    baclofen (LIORESAL) 10 MG tablet Take 1 tablet (10 mg total) by mouth 3 (three) times daily. for 10 days 30 tablet 0     No current facility-administered medications for this visit.       Review of Systems:  General: No fever, chills, fatigability, or weight loss.  Skin: No rashes, itching, or changes in color or texture of skin.  Chest: Denies QUIROZ, cyanosis, wheezing, cough, and sputum production.  Abdomen: Appetite fine. No weight loss. Denies diarrhea, abdominal pain, hematemesis, or blood in stool.  Musculoskeletal: No joint stiffness or swelling. Denies back pain.  : As above.  All other review of systems negative.    PMH:  Past Medical History:   Diagnosis Date    Joint pain     Unspecified urinary incontinence        PSH:  Past Surgical History:   Procedure Laterality Date    APPENDECTOMY         FamHx:  History reviewed. No pertinent family history.    SocHx:  Social History     Socioeconomic History    Marital status: Single   Tobacco Use    Smoking status: Current Every Day Smoker    Smokeless tobacco: Never Used    Tobacco comment: 1  pkg/day   Substance and Sexual Activity    Alcohol use: Not Currently    Drug use: Never    Sexual activity: Yes       Physical Exam:  Vitals:    08/01/22 0852   BP: (!) 132/97   Pulse: 78   Resp: 20   Temp: 98.2 °F (36.8 °C)     General: A&Ox3, no apparent distress, no deformities  Neck: No masses, normal thyroid  Lungs: CTA antoinette, no use of accessory muscles  Heart: RRR, no arrhythmias  Abdomen: Soft, NT, ND, no masses, no hernias, no hepatosplenomegaly  Lymphatic: Neck and groin nodes negative  Skin: The skin is warm and dry. No jaundice.  Ext: No c/c/e.        Labs:  None    Imaging:  CT- Bilateral nonobstructive renal stones.  No hydronephrosis. Cholelithiasis. Colonic diverticulosis.  No diverticulitis.         Impression:  Kidney stones      Plan: Follow-up in 6 weeks with KUB  increase fluid intake, limit salt in diet, limit protein to 30%, intake adequate calcium, increase brand, soy, Beets, nuts. Instructed patient on Avoiding bladder irritants, such as alcohol, citrus drinks or foods,salty foods, energy drinks, spicy foods, caffeine, sodas.

## 2022-08-01 NOTE — PROGRESS NOTES
Pt seen by ARON Patterson; Medications: Flomax & Toradol sent to pt preferred pharmacy; KUB ordered & pt given central scheduling information sheet; Pt instructed to return to clinic in 2 months w/LORNE; Discharge paperwork given w/pt verbalizing understanding

## 2022-08-16 DIAGNOSIS — N20.0 KIDNEY STONE: Primary | ICD-10-CM

## 2022-08-31 ENCOUNTER — HOSPITAL ENCOUNTER (OUTPATIENT)
Dept: RADIOLOGY | Facility: HOSPITAL | Age: 57
Discharge: HOME | End: 2022-08-31
Attending: NURSE PRACTITIONER
Payer: MEDICAID

## 2022-08-31 DIAGNOSIS — N20.0 KIDNEY STONE: ICD-10-CM

## 2022-08-31 PROCEDURE — 74018 RADEX ABDOMEN 1 VIEW: CPT | Mod: TC

## 2022-09-06 NOTE — PROGRESS NOTES
Chief Complaint: No chief complaint on file.      HPI:  Patient is a 57-year-old male here for 6 week follow-up kidney stones with KUB.  Today patient denies flank pain, groin pain, lower abdominal pain, evidence of passing stone, urinary frequency, urgency, incontinence, retention, dysuria, gross hematuria. Denies family history of stones, or urology pathology.           Allergies:  Review of patient's allergies indicates:  No Known Allergies    Medications:  Current Outpatient Medications   Medication Sig Dispense Refill    baclofen (LIORESAL) 10 MG tablet Take 1 tablet (10 mg total) by mouth 3 (three) times daily. for 10 days 30 tablet 0    HYDROcodone-acetaminophen (NORCO) 7.5-325 mg per tablet Take 1 tablet by mouth every 6 (six) hours as needed for Pain. 15 tablet 0    tamsulosin (FLOMAX) 0.4 mg Cap Take 1 capsule (0.4 mg total) by mouth once daily. 30 capsule 0     No current facility-administered medications for this visit.       Review of Systems:  General: No fever, chills, fatigability, or weight loss.  Skin: No rashes, itching, or changes in color or texture of skin.  Chest: Denies QUIROZ, cyanosis, wheezing, cough, and sputum production.  Abdomen: Appetite fine. No weight loss. Denies diarrhea, abdominal pain, hematemesis, or blood in stool.  Musculoskeletal: No joint stiffness or swelling. Denies back pain.  : As above.  All other review of systems negative.    PMH:  Past Medical History:   Diagnosis Date    Joint pain     Unspecified urinary incontinence        PSH:  Past Surgical History:   Procedure Laterality Date    APPENDECTOMY         FamHx:  No family history on file.    SocHx:  Social History     Socioeconomic History    Marital status: Single   Tobacco Use    Smoking status: Every Day    Smokeless tobacco: Never    Tobacco comments:     1 pkg/day   Substance and Sexual Activity    Alcohol use: Not Currently    Drug use: Never    Sexual activity: Yes       Physical Exam:  There were no vitals  filed for this visit.  General: A&Ox3, no apparent distress, no deformities  Neck: No masses, normal thyroid  Lungs: CTA antoinette, no use of accessory muscles  Heart: RRR, no arrhythmias  Abdomen: Soft, NT, ND, no masses, no hernias, no hepatosplenomegaly  Lymphatic: Neck and groin nodes negative  Skin: The skin is warm and dry. No jaundice.  Ext: No c/c/e.          Imaging:  KUB performed on 08/16/2022 revealed: Redemonstrated bilateral nephrolithiasis in splenic calcified granulomas. No convincing radiodense distal ureterolithiasis or evidence of acute abdominal process.      Impression:  Kidney stones    Plan:  Discussed KUB results with patient with actual visual display of X-Ray, patient verbalized understanding.  Continued Flomax X 1 month. Stone surveillance, Renal US, KUB, F/U 6  months.  Increase fluid intake, limit salt in diet, limit protein to 30%, intake adequate calcium, increase brand, soy, Beets, nuts. Instructed patient on Avoiding bladder irritants, such as alcohol, citrus drinks or foods,salty foods, energy drinks, spicy foods, caffeine, sodas.

## 2022-09-07 ENCOUNTER — OFFICE VISIT (OUTPATIENT)
Dept: UROLOGY | Facility: CLINIC | Age: 57
End: 2022-09-07
Payer: MEDICAID

## 2022-09-07 VITALS
BODY MASS INDEX: 24.37 KG/M2 | HEART RATE: 74 BPM | TEMPERATURE: 98 F | DIASTOLIC BLOOD PRESSURE: 91 MMHG | RESPIRATION RATE: 20 BRPM | WEIGHT: 170.19 LBS | HEIGHT: 70 IN | OXYGEN SATURATION: 97 % | SYSTOLIC BLOOD PRESSURE: 136 MMHG

## 2022-09-07 DIAGNOSIS — N20.0 KIDNEY STONE ON LEFT SIDE: ICD-10-CM

## 2022-09-07 PROCEDURE — 3075F SYST BP GE 130 - 139MM HG: CPT | Mod: CPTII,,, | Performed by: NURSE PRACTITIONER

## 2022-09-07 PROCEDURE — 3008F BODY MASS INDEX DOCD: CPT | Mod: CPTII,,, | Performed by: NURSE PRACTITIONER

## 2022-09-07 PROCEDURE — 1160F PR REVIEW ALL MEDS BY PRESCRIBER/CLIN PHARMACIST DOCUMENTED: ICD-10-PCS | Mod: CPTII,,, | Performed by: NURSE PRACTITIONER

## 2022-09-07 PROCEDURE — 1160F RVW MEDS BY RX/DR IN RCRD: CPT | Mod: CPTII,,, | Performed by: NURSE PRACTITIONER

## 2022-09-07 PROCEDURE — 3080F PR MOST RECENT DIASTOLIC BLOOD PRESSURE >= 90 MM HG: ICD-10-PCS | Mod: CPTII,,, | Performed by: NURSE PRACTITIONER

## 2022-09-07 PROCEDURE — 3075F PR MOST RECENT SYSTOLIC BLOOD PRESS GE 130-139MM HG: ICD-10-PCS | Mod: CPTII,,, | Performed by: NURSE PRACTITIONER

## 2022-09-07 PROCEDURE — 3008F PR BODY MASS INDEX (BMI) DOCUMENTED: ICD-10-PCS | Mod: CPTII,,, | Performed by: NURSE PRACTITIONER

## 2022-09-07 PROCEDURE — 99214 OFFICE O/P EST MOD 30 MIN: CPT | Mod: PBBFAC | Performed by: NURSE PRACTITIONER

## 2022-09-07 PROCEDURE — 1159F MED LIST DOCD IN RCRD: CPT | Mod: CPTII,,, | Performed by: NURSE PRACTITIONER

## 2022-09-07 PROCEDURE — 99213 PR OFFICE/OUTPT VISIT, EST, LEVL III, 20-29 MIN: ICD-10-PCS | Mod: S$PBB,,, | Performed by: NURSE PRACTITIONER

## 2022-09-07 PROCEDURE — 99213 OFFICE O/P EST LOW 20 MIN: CPT | Mod: S$PBB,,, | Performed by: NURSE PRACTITIONER

## 2022-09-07 PROCEDURE — 3080F DIAST BP >= 90 MM HG: CPT | Mod: CPTII,,, | Performed by: NURSE PRACTITIONER

## 2022-09-07 PROCEDURE — 1159F PR MEDICATION LIST DOCUMENTED IN MEDICAL RECORD: ICD-10-PCS | Mod: CPTII,,, | Performed by: NURSE PRACTITIONER

## 2022-09-07 RX ORDER — TAMSULOSIN HYDROCHLORIDE 0.4 MG/1
0.4 CAPSULE ORAL DAILY
Qty: 30 CAPSULE | Refills: 0 | Status: SHIPPED | OUTPATIENT
Start: 2022-09-07 | End: 2022-09-26

## 2023-02-27 DIAGNOSIS — N20.0 KIDNEY STONE: Primary | ICD-10-CM

## 2023-03-09 ENCOUNTER — HOSPITAL ENCOUNTER (OUTPATIENT)
Dept: RADIOLOGY | Facility: HOSPITAL | Age: 58
Discharge: HOME | End: 2023-03-09
Attending: NURSE PRACTITIONER
Payer: MEDICAID

## 2023-03-09 DIAGNOSIS — N20.0 KIDNEY STONE: ICD-10-CM

## 2023-03-09 PROCEDURE — 76775 US EXAM ABDO BACK WALL LIM: CPT | Mod: TC

## 2023-03-13 ENCOUNTER — TELEPHONE (OUTPATIENT)
Dept: UROLOGY | Facility: CLINIC | Age: 58
End: 2023-03-13
Payer: MEDICAID

## 2023-03-13 NOTE — TELEPHONE ENCOUNTER
Patient called to confirm appointment for Urology on 03/15/2023. Appt confirmed. Patient informed of labs that need to be done prior to appointment. Voiced understanding.

## 2023-03-15 ENCOUNTER — OFFICE VISIT (OUTPATIENT)
Dept: UROLOGY | Facility: CLINIC | Age: 58
End: 2023-03-15
Payer: MEDICAID

## 2023-03-15 VITALS
BODY MASS INDEX: 23.8 KG/M2 | WEIGHT: 166.25 LBS | DIASTOLIC BLOOD PRESSURE: 90 MMHG | HEART RATE: 88 BPM | RESPIRATION RATE: 20 BRPM | SYSTOLIC BLOOD PRESSURE: 139 MMHG | HEIGHT: 70 IN | TEMPERATURE: 98 F | OXYGEN SATURATION: 97 %

## 2023-03-15 DIAGNOSIS — N20.0 KIDNEY STONE: Primary | ICD-10-CM

## 2023-03-15 PROCEDURE — 99214 OFFICE O/P EST MOD 30 MIN: CPT | Mod: PBBFAC | Performed by: NURSE PRACTITIONER

## 2023-03-15 PROCEDURE — 3075F SYST BP GE 130 - 139MM HG: CPT | Mod: CPTII,,, | Performed by: NURSE PRACTITIONER

## 2023-03-15 PROCEDURE — 99213 PR OFFICE/OUTPT VISIT, EST, LEVL III, 20-29 MIN: ICD-10-PCS | Mod: S$PBB,,, | Performed by: NURSE PRACTITIONER

## 2023-03-15 PROCEDURE — 3080F PR MOST RECENT DIASTOLIC BLOOD PRESSURE >= 90 MM HG: ICD-10-PCS | Mod: CPTII,,, | Performed by: NURSE PRACTITIONER

## 2023-03-15 PROCEDURE — 1159F PR MEDICATION LIST DOCUMENTED IN MEDICAL RECORD: ICD-10-PCS | Mod: CPTII,,, | Performed by: NURSE PRACTITIONER

## 2023-03-15 PROCEDURE — 99213 OFFICE O/P EST LOW 20 MIN: CPT | Mod: S$PBB,,, | Performed by: NURSE PRACTITIONER

## 2023-03-15 PROCEDURE — 3008F BODY MASS INDEX DOCD: CPT | Mod: CPTII,,, | Performed by: NURSE PRACTITIONER

## 2023-03-15 PROCEDURE — 3080F DIAST BP >= 90 MM HG: CPT | Mod: CPTII,,, | Performed by: NURSE PRACTITIONER

## 2023-03-15 PROCEDURE — 1159F MED LIST DOCD IN RCRD: CPT | Mod: CPTII,,, | Performed by: NURSE PRACTITIONER

## 2023-03-15 PROCEDURE — 3075F PR MOST RECENT SYSTOLIC BLOOD PRESS GE 130-139MM HG: ICD-10-PCS | Mod: CPTII,,, | Performed by: NURSE PRACTITIONER

## 2023-03-15 PROCEDURE — 3008F PR BODY MASS INDEX (BMI) DOCUMENTED: ICD-10-PCS | Mod: CPTII,,, | Performed by: NURSE PRACTITIONER

## 2023-03-15 NOTE — PROGRESS NOTES
Chief Complaint: No chief complaint on file.      HPI:  Patient is a 57-year-old male here for 6 Month follow-up kidney stones with KUB.  Today patient denies flank pain, groin pain, lower abdominal pain, evidence of passing stone, urinary frequency, urgency, incontinence, retention, dysuria, gross hematuria.  Renal ultrasound as listed below.    Allergies:  Review of patient's allergies indicates:  No Known Allergies    Medications:  Current Outpatient Medications   Medication Sig Dispense Refill    HYDROcodone-acetaminophen (NORCO) 7.5-325 mg per tablet Take 1 tablet by mouth every 6 (six) hours as needed for Pain. 15 tablet 0    tamsulosin (FLOMAX) 0.4 mg Cap TAKE 1 CAPSULE(0.4 MG) BY MOUTH EVERY DAY 30 capsule 0    baclofen (LIORESAL) 10 MG tablet Take 1 tablet (10 mg total) by mouth 3 (three) times daily. for 10 days 30 tablet 0     No current facility-administered medications for this visit.       Review of Systems:  General: No fever, chills, fatigability, or weight loss.  Skin: No rashes, itching, or changes in color or texture of skin.  Chest: Denies QUIROZ, cyanosis, wheezing, cough, and sputum production.  Abdomen: Appetite fine. No weight loss. Denies diarrhea, abdominal pain, hematemesis, or blood in stool.  Musculoskeletal: No joint stiffness or swelling. Denies back pain.  : As above.  All other review of systems negative.    PMH:  Past Medical History:   Diagnosis Date    Joint pain     Unspecified urinary incontinence        PSH:  Past Surgical History:   Procedure Laterality Date    APPENDECTOMY         FamHx:  No family history on file.    SocHx:  Social History     Socioeconomic History    Marital status: Single   Tobacco Use    Smoking status: Every Day     Packs/day: 1.00     Types: Cigarettes     Passive exposure: Never    Smokeless tobacco: Never    Tobacco comments:     1 pkg/day   Substance and Sexual Activity    Alcohol use: Not Currently    Drug use: Never    Sexual activity: Yes        Physical Exam:  Vitals:    03/15/23 0746   BP: (!) 139/90   Pulse: 88   Resp: 20   Temp: 97.9 °F (36.6 °C)     General: A&Ox3, no apparent distress, no deformities  Neck: No masses, normal thyroid  Lungs: CTA antoinette, no use of accessory muscles  Heart: RRR, no arrhythmias  Abdomen: Soft, NT, ND, no masses, no hernias, no hepatosplenomegaly  Lymphatic: Neck and groin nodes negative  Skin: The skin is warm and dry. No jaundice.  Ext: No c/c/e.        Imaging:  Renal ultrasound on 03/09/2023: Nonobstructing medullary calculi in both kidneys. Simple cyst in the liver      Impression:  Kidney stone      Plan:Instructed  patient to continue tamsulosin, continue fluid intake, limit salt in diet, limit protein to 30%, intake adequate calcium, decrease brand, soy, Beets, Almonds, Rhubard, Buckwheat flour, baked potato, raspberry, potato chips Spinach, Miso, Tahini, sesame, Swiss Chard. Instructed patient on Avoiding bladder irritants, such as alcohol, citrus drinks or foods,salty foods, energy drinks, spicy foods, caffeine, sodas. RTC 6 months with Renal Ultrasound.

## 2023-09-13 ENCOUNTER — OFFICE VISIT (OUTPATIENT)
Dept: BEHAVIORAL HEALTH | Facility: CLINIC | Age: 58
End: 2023-09-13
Payer: MEDICAID

## 2023-09-13 VITALS
HEART RATE: 80 BPM | BODY MASS INDEX: 24.41 KG/M2 | DIASTOLIC BLOOD PRESSURE: 89 MMHG | SYSTOLIC BLOOD PRESSURE: 137 MMHG | OXYGEN SATURATION: 97 % | WEIGHT: 168.63 LBS | TEMPERATURE: 98 F

## 2023-09-13 DIAGNOSIS — F31.89 OTHER BIPOLAR DISORDER: ICD-10-CM

## 2023-09-13 PROCEDURE — 3079F PR MOST RECENT DIASTOLIC BLOOD PRESSURE 80-89 MM HG: ICD-10-PCS | Mod: CPTII,,, | Performed by: STUDENT IN AN ORGANIZED HEALTH CARE EDUCATION/TRAINING PROGRAM

## 2023-09-13 PROCEDURE — 1160F PR REVIEW ALL MEDS BY PRESCRIBER/CLIN PHARMACIST DOCUMENTED: ICD-10-PCS | Mod: CPTII,,, | Performed by: STUDENT IN AN ORGANIZED HEALTH CARE EDUCATION/TRAINING PROGRAM

## 2023-09-13 PROCEDURE — 3075F PR MOST RECENT SYSTOLIC BLOOD PRESS GE 130-139MM HG: ICD-10-PCS | Mod: CPTII,,, | Performed by: STUDENT IN AN ORGANIZED HEALTH CARE EDUCATION/TRAINING PROGRAM

## 2023-09-13 PROCEDURE — 1159F PR MEDICATION LIST DOCUMENTED IN MEDICAL RECORD: ICD-10-PCS | Mod: CPTII,,, | Performed by: STUDENT IN AN ORGANIZED HEALTH CARE EDUCATION/TRAINING PROGRAM

## 2023-09-13 PROCEDURE — 99203 PR OFFICE/OUTPT VISIT, NEW, LEVL III, 30-44 MIN: ICD-10-PCS | Mod: AF,HB,S$PBB, | Performed by: STUDENT IN AN ORGANIZED HEALTH CARE EDUCATION/TRAINING PROGRAM

## 2023-09-13 PROCEDURE — 99203 OFFICE O/P NEW LOW 30 MIN: CPT | Mod: AF,HB,S$PBB, | Performed by: STUDENT IN AN ORGANIZED HEALTH CARE EDUCATION/TRAINING PROGRAM

## 2023-09-13 PROCEDURE — 1159F MED LIST DOCD IN RCRD: CPT | Mod: CPTII,,, | Performed by: STUDENT IN AN ORGANIZED HEALTH CARE EDUCATION/TRAINING PROGRAM

## 2023-09-13 PROCEDURE — 99213 OFFICE O/P EST LOW 20 MIN: CPT | Mod: PBBFAC,PN | Performed by: STUDENT IN AN ORGANIZED HEALTH CARE EDUCATION/TRAINING PROGRAM

## 2023-09-13 PROCEDURE — 3008F BODY MASS INDEX DOCD: CPT | Mod: CPTII,,, | Performed by: STUDENT IN AN ORGANIZED HEALTH CARE EDUCATION/TRAINING PROGRAM

## 2023-09-13 PROCEDURE — 3075F SYST BP GE 130 - 139MM HG: CPT | Mod: CPTII,,, | Performed by: STUDENT IN AN ORGANIZED HEALTH CARE EDUCATION/TRAINING PROGRAM

## 2023-09-13 PROCEDURE — 3008F PR BODY MASS INDEX (BMI) DOCUMENTED: ICD-10-PCS | Mod: CPTII,,, | Performed by: STUDENT IN AN ORGANIZED HEALTH CARE EDUCATION/TRAINING PROGRAM

## 2023-09-13 PROCEDURE — 1160F RVW MEDS BY RX/DR IN RCRD: CPT | Mod: CPTII,,, | Performed by: STUDENT IN AN ORGANIZED HEALTH CARE EDUCATION/TRAINING PROGRAM

## 2023-09-13 PROCEDURE — 3079F DIAST BP 80-89 MM HG: CPT | Mod: CPTII,,, | Performed by: STUDENT IN AN ORGANIZED HEALTH CARE EDUCATION/TRAINING PROGRAM
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
## 2024-04-02 ENCOUNTER — HOSPITAL ENCOUNTER (EMERGENCY)
Facility: HOSPITAL | Age: 59
Discharge: PSYCHIATRIC OR SUBSTANCE USE FACILITY | End: 2024-04-03
Attending: EMERGENCY MEDICINE
Payer: MEDICAID

## 2024-04-02 VITALS
RESPIRATION RATE: 14 BRPM | SYSTOLIC BLOOD PRESSURE: 113 MMHG | WEIGHT: 165 LBS | HEART RATE: 68 BPM | HEIGHT: 70 IN | DIASTOLIC BLOOD PRESSURE: 77 MMHG | OXYGEN SATURATION: 97 % | TEMPERATURE: 97 F | BODY MASS INDEX: 23.62 KG/M2

## 2024-04-02 DIAGNOSIS — F19.94 SUBSTANCE INDUCED MOOD DISORDER: Primary | ICD-10-CM

## 2024-04-02 DIAGNOSIS — F14.10 COCAINE ABUSE: ICD-10-CM

## 2024-04-02 DIAGNOSIS — F23 ACUTE PSYCHOSIS: ICD-10-CM

## 2024-04-02 DIAGNOSIS — R46.89 THREATENING BEHAVIOR: ICD-10-CM

## 2024-04-02 LAB
ALBUMIN SERPL-MCNC: 4.2 G/DL (ref 3.5–5)
ALBUMIN/GLOB SERPL: 1.4 RATIO (ref 1.1–2)
ALP SERPL-CCNC: 75 UNIT/L (ref 40–150)
ALT SERPL-CCNC: 19 UNIT/L (ref 0–55)
AMPHET UR QL SCN: NEGATIVE
APAP SERPL-MCNC: <17.4 UG/ML (ref 17.4–30)
APPEARANCE UR: CLEAR
AST SERPL-CCNC: 21 UNIT/L (ref 5–34)
BACTERIA #/AREA URNS AUTO: ABNORMAL /HPF
BARBITURATE SCN PRESENT UR: NEGATIVE
BASOPHILS # BLD AUTO: 0.05 X10(3)/MCL
BASOPHILS NFR BLD AUTO: 0.6 %
BENZODIAZ UR QL SCN: NEGATIVE
BILIRUB SERPL-MCNC: 0.4 MG/DL
BILIRUB UR QL STRIP.AUTO: NEGATIVE
BUN SERPL-MCNC: 20 MG/DL (ref 8.4–25.7)
CALCIUM SERPL-MCNC: 10.2 MG/DL (ref 8.4–10.2)
CANNABINOIDS UR QL SCN: NEGATIVE
CHLORIDE SERPL-SCNC: 104 MMOL/L (ref 98–107)
CO2 SERPL-SCNC: 25 MMOL/L (ref 22–29)
COCAINE UR QL SCN: POSITIVE
COLOR UR AUTO: ABNORMAL
CREAT SERPL-MCNC: 1.09 MG/DL (ref 0.73–1.18)
EOSINOPHIL # BLD AUTO: 0.39 X10(3)/MCL (ref 0–0.9)
EOSINOPHIL NFR BLD AUTO: 4.5 %
ERYTHROCYTE [DISTWIDTH] IN BLOOD BY AUTOMATED COUNT: 12.4 % (ref 11.5–17)
ETHANOL SERPL-MCNC: <10 MG/DL
FENTANYL UR QL SCN: NEGATIVE
FLUAV AG UPPER RESP QL IA.RAPID: NOT DETECTED
FLUBV AG UPPER RESP QL IA.RAPID: NOT DETECTED
GFR SERPLBLD CREATININE-BSD FMLA CKD-EPI: >60 MLS/MIN/1.73/M2
GLOBULIN SER-MCNC: 2.9 GM/DL (ref 2.4–3.5)
GLUCOSE SERPL-MCNC: 106 MG/DL (ref 74–100)
GLUCOSE UR QL STRIP.AUTO: NORMAL
HCT VFR BLD AUTO: 44.7 % (ref 42–52)
HGB BLD-MCNC: 15.3 G/DL (ref 14–18)
IMM GRANULOCYTES # BLD AUTO: 0.02 X10(3)/MCL (ref 0–0.04)
IMM GRANULOCYTES NFR BLD AUTO: 0.2 %
KETONES UR QL STRIP.AUTO: NEGATIVE
LEUKOCYTE ESTERASE UR QL STRIP.AUTO: NEGATIVE
LYMPHOCYTES # BLD AUTO: 2.84 X10(3)/MCL (ref 0.6–4.6)
LYMPHOCYTES NFR BLD AUTO: 32.9 %
MCH RBC QN AUTO: 29.7 PG (ref 27–31)
MCHC RBC AUTO-ENTMCNC: 34.2 G/DL (ref 33–36)
MCV RBC AUTO: 86.6 FL (ref 80–94)
MDMA UR QL SCN: NEGATIVE
MONOCYTES # BLD AUTO: 0.66 X10(3)/MCL (ref 0.1–1.3)
MONOCYTES NFR BLD AUTO: 7.7 %
MUCOUS THREADS URNS QL MICRO: ABNORMAL /LPF
NEUTROPHILS # BLD AUTO: 4.66 X10(3)/MCL (ref 2.1–9.2)
NEUTROPHILS NFR BLD AUTO: 54.1 %
NITRITE UR QL STRIP.AUTO: NEGATIVE
NRBC BLD AUTO-RTO: 0 %
OPIATES UR QL SCN: NEGATIVE
PCP UR QL: NEGATIVE
PH UR STRIP.AUTO: 6.5 [PH]
PH UR: 6.5 [PH] (ref 3–11)
PLATELET # BLD AUTO: 280 X10(3)/MCL (ref 130–400)
PMV BLD AUTO: 9.7 FL (ref 7.4–10.4)
POTASSIUM SERPL-SCNC: 4.3 MMOL/L (ref 3.5–5.1)
PROT SERPL-MCNC: 7.1 GM/DL (ref 6.4–8.3)
PROT UR QL STRIP.AUTO: NEGATIVE
RBC # BLD AUTO: 5.16 X10(6)/MCL (ref 4.7–6.1)
RBC #/AREA URNS AUTO: ABNORMAL /HPF
RBC UR QL AUTO: ABNORMAL
SARS-COV-2 RNA RESP QL NAA+PROBE: NOT DETECTED
SODIUM SERPL-SCNC: 140 MMOL/L (ref 136–145)
SP GR UR STRIP.AUTO: 1.02 (ref 1–1.03)
SPECIFIC GRAVITY, URINE AUTO (.000) (OHS): 1.02 (ref 1–1.03)
SQUAMOUS #/AREA URNS LPF: ABNORMAL /HPF
TSH SERPL-ACNC: 1.36 UIU/ML (ref 0.35–4.94)
UROBILINOGEN UR STRIP-ACNC: NORMAL
WBC # SPEC AUTO: 8.62 X10(3)/MCL (ref 4.5–11.5)
WBC #/AREA URNS AUTO: ABNORMAL /HPF

## 2024-04-02 PROCEDURE — 63600175 PHARM REV CODE 636 W HCPCS

## 2024-04-02 PROCEDURE — 99285 EMERGENCY DEPT VISIT HI MDM: CPT | Mod: 25

## 2024-04-02 PROCEDURE — 81001 URINALYSIS AUTO W/SCOPE: CPT | Mod: XB | Performed by: EMERGENCY MEDICINE

## 2024-04-02 PROCEDURE — 25000003 PHARM REV CODE 250

## 2024-04-02 PROCEDURE — 80053 COMPREHEN METABOLIC PANEL: CPT | Performed by: EMERGENCY MEDICINE

## 2024-04-02 PROCEDURE — 84443 ASSAY THYROID STIM HORMONE: CPT | Performed by: EMERGENCY MEDICINE

## 2024-04-02 PROCEDURE — A4216 STERILE WATER/SALINE, 10 ML: HCPCS

## 2024-04-02 PROCEDURE — 0240U COVID/FLU A&B PCR: CPT | Performed by: EMERGENCY MEDICINE

## 2024-04-02 PROCEDURE — 85025 COMPLETE CBC W/AUTO DIFF WBC: CPT | Performed by: EMERGENCY MEDICINE

## 2024-04-02 PROCEDURE — 80143 DRUG ASSAY ACETAMINOPHEN: CPT | Performed by: EMERGENCY MEDICINE

## 2024-04-02 PROCEDURE — 80307 DRUG TEST PRSMV CHEM ANLYZR: CPT | Performed by: EMERGENCY MEDICINE

## 2024-04-02 PROCEDURE — 82077 ASSAY SPEC XCP UR&BREATH IA: CPT | Performed by: EMERGENCY MEDICINE

## 2024-04-02 PROCEDURE — 96372 THER/PROPH/DIAG INJ SC/IM: CPT

## 2024-04-02 RX ORDER — ZIPRASIDONE MESYLATE 20 MG/ML
20 INJECTION, POWDER, LYOPHILIZED, FOR SOLUTION INTRAMUSCULAR
Status: COMPLETED | OUTPATIENT
Start: 2024-04-02 | End: 2024-04-02

## 2024-04-02 RX ORDER — LORAZEPAM 2 MG/ML
2 INJECTION INTRAMUSCULAR
Status: COMPLETED | OUTPATIENT
Start: 2024-04-02 | End: 2024-04-02

## 2024-04-02 RX ORDER — WATER FOR INJECTION,STERILE
VIAL (ML) INJECTION
Status: COMPLETED
Start: 2024-04-02 | End: 2024-04-02

## 2024-04-02 RX ORDER — DIPHENHYDRAMINE HYDROCHLORIDE 50 MG/ML
50 INJECTION INTRAMUSCULAR; INTRAVENOUS
Status: COMPLETED | OUTPATIENT
Start: 2024-04-02 | End: 2024-04-02

## 2024-04-02 RX ORDER — DIPHENHYDRAMINE HYDROCHLORIDE 50 MG/ML
INJECTION INTRAMUSCULAR; INTRAVENOUS
Status: COMPLETED
Start: 2024-04-02 | End: 2024-04-02

## 2024-04-02 RX ORDER — LORAZEPAM 2 MG/ML
INJECTION INTRAMUSCULAR
Status: COMPLETED
Start: 2024-04-02 | End: 2024-04-02

## 2024-04-02 RX ORDER — ZIPRASIDONE MESYLATE 20 MG/ML
INJECTION, POWDER, LYOPHILIZED, FOR SOLUTION INTRAMUSCULAR
Status: COMPLETED
Start: 2024-04-02 | End: 2024-04-02

## 2024-04-02 RX ADMIN — LORAZEPAM 2 MG: 2 INJECTION INTRAMUSCULAR; INTRAVENOUS at 07:04

## 2024-04-02 RX ADMIN — DIPHENHYDRAMINE HYDROCHLORIDE 50 MG: 50 INJECTION INTRAMUSCULAR; INTRAVENOUS at 07:04

## 2024-04-02 RX ADMIN — ZIPRASIDONE MESYLATE 20 MG: 20 INJECTION, POWDER, LYOPHILIZED, FOR SOLUTION INTRAMUSCULAR at 07:04

## 2024-04-02 RX ADMIN — WATER: 1 INJECTION INTRAMUSCULAR; INTRAVENOUS; SUBCUTANEOUS at 07:04

## 2024-04-02 RX ADMIN — LORAZEPAM 2 MG: 2 INJECTION INTRAMUSCULAR at 07:04

## 2024-04-03 ENCOUNTER — HOSPITAL ENCOUNTER (INPATIENT)
Facility: HOSPITAL | Age: 59
LOS: 8 days | Discharge: HOME | DRG: 885 | End: 2024-04-11
Attending: PSYCHIATRY & NEUROLOGY | Admitting: PSYCHIATRY & NEUROLOGY
Payer: MEDICAID

## 2024-04-03 DIAGNOSIS — F29 PSYCHOSIS: ICD-10-CM

## 2024-04-03 DIAGNOSIS — N20.0 KIDNEY STONE ON LEFT SIDE: ICD-10-CM

## 2024-04-03 PROBLEM — Z91.148 NONADHERENCE TO MEDICATION: Status: ACTIVE | Noted: 2024-04-03

## 2024-04-03 PROBLEM — F14.10 COCAINE ABUSE: Status: ACTIVE | Noted: 2024-04-03

## 2024-04-03 PROBLEM — F31.2 BIPOLAR AFFECTIVE DISORDER, CURRENT EPISODE MANIC WITH PSYCHOTIC SYMPTOMS: Status: ACTIVE | Noted: 2024-04-03

## 2024-04-03 PROBLEM — Z72.0 TOBACCO ABUSE: Status: ACTIVE | Noted: 2024-04-03

## 2024-04-03 PROBLEM — R46.89 AGGRESSIVE BEHAVIOR OF ADULT: Status: ACTIVE | Noted: 2024-04-03

## 2024-04-03 PROCEDURE — 25000003 PHARM REV CODE 250: Performed by: PSYCHIATRY & NEUROLOGY

## 2024-04-03 PROCEDURE — 11400000 HC PSYCH PRIVATE ROOM

## 2024-04-03 RX ORDER — IBUPROFEN 400 MG/1
400 TABLET ORAL EVERY 6 HOURS PRN
Status: DISCONTINUED | OUTPATIENT
Start: 2024-04-03 | End: 2024-04-08 | Stop reason: SDUPTHER

## 2024-04-03 RX ORDER — TAMSULOSIN HYDROCHLORIDE 0.4 MG/1
0.4 CAPSULE ORAL NIGHTLY
Status: DISCONTINUED | OUTPATIENT
Start: 2024-04-03 | End: 2024-04-11 | Stop reason: HOSPADM

## 2024-04-03 RX ORDER — HYDROXYZINE PAMOATE 25 MG/1
50 CAPSULE ORAL EVERY 6 HOURS PRN
Status: DISCONTINUED | OUTPATIENT
Start: 2024-04-03 | End: 2024-04-08

## 2024-04-03 RX ORDER — CLONAZEPAM 0.5 MG/1
0.5 TABLET ORAL 3 TIMES DAILY
Status: DISCONTINUED | OUTPATIENT
Start: 2024-04-03 | End: 2024-04-05

## 2024-04-03 RX ORDER — ACETAMINOPHEN 325 MG/1
650 TABLET ORAL EVERY 6 HOURS PRN
Status: DISCONTINUED | OUTPATIENT
Start: 2024-04-03 | End: 2024-04-11 | Stop reason: HOSPADM

## 2024-04-03 RX ORDER — OLANZAPINE 10 MG/1
10 TABLET, ORALLY DISINTEGRATING ORAL EVERY 8 HOURS PRN
Status: DISCONTINUED | OUTPATIENT
Start: 2024-04-03 | End: 2024-04-11 | Stop reason: HOSPADM

## 2024-04-03 RX ORDER — LORAZEPAM 1 MG/1
1 TABLET ORAL 4 TIMES DAILY PRN
Status: DISCONTINUED | OUTPATIENT
Start: 2024-04-03 | End: 2024-04-08

## 2024-04-03 RX ORDER — TAMSULOSIN HYDROCHLORIDE 0.4 MG/1
0.4 CAPSULE ORAL DAILY
Status: DISCONTINUED | OUTPATIENT
Start: 2024-04-04 | End: 2024-04-03

## 2024-04-03 RX ORDER — OLANZAPINE 10 MG/1
10 TABLET, ORALLY DISINTEGRATING ORAL NIGHTLY
Status: DISCONTINUED | OUTPATIENT
Start: 2024-04-03 | End: 2024-04-07

## 2024-04-03 RX ORDER — ALUMINUM HYDROXIDE, MAGNESIUM HYDROXIDE, AND SIMETHICONE 1200; 120; 1200 MG/30ML; MG/30ML; MG/30ML
30 SUSPENSION ORAL EVERY 6 HOURS PRN
Status: DISCONTINUED | OUTPATIENT
Start: 2024-04-03 | End: 2024-04-11 | Stop reason: HOSPADM

## 2024-04-03 RX ORDER — DIVALPROEX SODIUM 500 MG/1
1000 TABLET, FILM COATED, EXTENDED RELEASE ORAL DAILY
Status: DISCONTINUED | OUTPATIENT
Start: 2024-04-03 | End: 2024-04-06

## 2024-04-03 RX ADMIN — CLONAZEPAM 0.5 MG: 0.5 TABLET ORAL at 08:04

## 2024-04-03 RX ADMIN — DIVALPROEX SODIUM 1000 MG: 500 TABLET, FILM COATED, EXTENDED RELEASE ORAL at 02:04

## 2024-04-03 RX ADMIN — CLONAZEPAM 0.5 MG: 0.5 TABLET ORAL at 02:04

## 2024-04-03 RX ADMIN — OLANZAPINE 10 MG: 10 TABLET, ORALLY DISINTEGRATING ORAL at 08:04

## 2024-04-03 RX ADMIN — TAMSULOSIN HYDROCHLORIDE 0.4 MG: 0.4 CAPSULE ORAL at 09:04

## 2024-04-03 NOTE — PROGRESS NOTES
Recreation Therapy Progress Note    Date:  04/03/2024    Time:  1400    Group Title:  Leisure skills    Mood:  Anxious and hyper    Behavior:  Cooperative on unit    Affect:  Anxious and hyper but able to redirect    Speech:  Patient more quiet this afternoon    Cognition:  Patient was too anxious to work on cognitive activity but did agree to stay in group with staff and peers.    Participation Level:  Patient participate at minimum level with prompts and motivation from RT staff    Intervention:  Continue RT services assist patient one-to-one as needed to complete his goals on treatment plan          Recreation Therapist   Signature:  Anna Calvillo MA [facility]S

## 2024-04-03 NOTE — SUBJECTIVE & OBJECTIVE
Past Medical History:   Diagnosis Date    Joint pain     Unspecified urinary incontinence        Past Surgical History:   Procedure Laterality Date    APPENDECTOMY         Review of patient's allergies indicates:  No Known Allergies    Current Facility-Administered Medications on File Prior to Encounter   Medication    [COMPLETED] diphenhydrAMINE injection 50 mg    [COMPLETED] LORazepam injection 2 mg    [COMPLETED] sterile water for injection injection    [COMPLETED] ziprasidone injection 20 mg     Current Outpatient Medications on File Prior to Encounter   Medication Sig    baclofen (LIORESAL) 10 MG tablet Take 1 tablet (10 mg total) by mouth 3 (three) times daily. for 10 days (Patient not taking: Reported on 9/13/2023)    HYDROcodone-acetaminophen (NORCO) 7.5-325 mg per tablet Take 1 tablet by mouth every 6 (six) hours as needed for Pain. (Patient not taking: Reported on 9/13/2023)    tamsulosin (FLOMAX) 0.4 mg Cap TAKE 1 CAPSULE(0.4 MG) BY MOUTH EVERY DAY (Patient not taking: Reported on 9/13/2023)     Family History    None       Tobacco Use    Smoking status: Every Day     Current packs/day: 1.00     Types: Cigarettes     Passive exposure: Never    Smokeless tobacco: Never    Tobacco comments:     1 pkg/day   Substance and Sexual Activity    Alcohol use: Not Currently    Drug use: Not Currently    Sexual activity: Yes     Review of Systems   Constitutional: Negative.    HENT: Negative.     Eyes: Negative.    Respiratory: Negative.     Cardiovascular: Negative.    Gastrointestinal: Negative.    Endocrine: Negative.    Genitourinary: Negative.    Musculoskeletal: Negative.    Skin: Negative.    Allergic/Immunologic: Negative.    Neurological: Negative.    Hematological: Negative.    Psychiatric/Behavioral:  Positive for agitation and hallucinations.      Objective:     Vital Signs (Most Recent):  Temp: 97.7 °F (36.5 °C) (04/03/24 0614)  Pulse: 68 (04/03/24 0614)  Resp: 16 (04/03/24 0614)  BP: (!) 141/92  (04/03/24 0614)  SpO2: 98 % (04/03/24 0614) Vital Signs (24h Range):  Temp:  [97 °F (36.1 °C)-97.8 °F (36.6 °C)] 97.7 °F (36.5 °C)  Pulse:  [68-97] 68  Resp:  [14-20] 16  SpO2:  [97 %-99 %] 98 %  BP: (113-141)/() 141/92     Weight: 75.1 kg (165 lb 9.1 oz)  Body mass index is 24.45 kg/m².     Physical Exam  Constitutional:       Appearance: Normal appearance. He is normal weight.   HENT:      Head: Normocephalic and atraumatic.      Nose: Nose normal.      Mouth/Throat:      Mouth: Mucous membranes are moist.      Pharynx: Oropharynx is clear.   Eyes:      Extraocular Movements: Extraocular movements intact and EOM normal.      Conjunctiva/sclera: Conjunctivae normal.      Pupils: Pupils are equal, round, and reactive to light.   Cardiovascular:      Rate and Rhythm: Normal rate and regular rhythm.      Pulses: Normal pulses.      Heart sounds: Normal heart sounds.   Pulmonary:      Effort: Pulmonary effort is normal.      Breath sounds: Normal breath sounds.   Abdominal:      General: Bowel sounds are normal.      Palpations: Abdomen is soft.   Musculoskeletal:         General: Normal range of motion.      Cervical back: Normal range of motion and neck supple.   Skin:     General: Skin is warm and dry.      Capillary Refill: Capillary refill takes 2 to 3 seconds.   Neurological:      General: No focal deficit present.      Mental Status: He is alert. Mental status is at baseline.   Psychiatric:         Attention and Perception: He perceives auditory and visual hallucinations.         Speech: Speech normal.         Behavior: Behavior is aggressive.         Thought Content: Thought content is delusional.         Cognition and Memory: Cognition and memory normal.         Judgment: Judgment is inappropriate.         CRANIAL NERVES     CN I  cranial nerve I not tested    CN II   Visual fields full to confrontation.     CN III, IV, VI   Pupils are equal, round, and reactive to light.  Extraocular motions are normal.  "  CN III: no CN III palsy  CN VI: no CN VI palsy    CN V   Facial sensation intact.     CN VII   Facial expression full, symmetric.     CN VIII   CN VIII normal.     CN IX, X   CN IX normal.   CN X normal.     CN XI   CN XI normal.     CN XII   CN XII normal.         Significant Labs: All pertinent labs within the past 24 hours have been reviewed.  BMP:   Recent Labs   Lab 04/02/24 1929      K 4.3   CO2 25   BUN 20.0   CREATININE 1.09   CALCIUM 10.2     CBC:   Recent Labs   Lab 04/02/24 1929   WBC 8.62   HGB 15.3   HCT 44.7        CMP:   Recent Labs   Lab 04/02/24 1929      K 4.3   CO2 25   BUN 20.0   CREATININE 1.09   CALCIUM 10.2   ALBUMIN 4.2   BILITOT 0.4   ALKPHOS 75   AST 21   ALT 19     Magnesium: No results for input(s): "MG" in the last 48 hours.  Urine Studies:   Recent Labs   Lab 04/02/24 1944   APPEARANCEUA Clear   PROTEINUA Negative   BILIRUBINUA Negative   UROBILINOGEN Normal   LEUKOCYTESUR Negative   RBCUA 6-10*   WBCUA 0-5   BACTERIA None Seen       Significant Imaging: I have reviewed all pertinent imaging results/findings within the past 24 hours.  "

## 2024-04-03 NOTE — ASSESSMENT & PLAN NOTE
Patient was evidence of cocaine in his urine.  He minimized his overall history and exposure to use of cocaine.  Suspect he was more chronic pattern of usage than he was currently admitting.
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
## 2024-04-03 NOTE — ED PROVIDER NOTES
"Encounter Date: 4/2/2024    SCRIBE #1 NOTE: I, Chris Treydiannesunshine, am scribing for, and in the presence of,  Alex Leonard MD. I have scribed the following portions of the note - Other sections scribed: HPI,ROS,PE.       History     Chief Complaint   Patient presents with    Psychiatric Evaluation     OPC per patient's : "manic and paranoid about people living around him. He is screaming at other residents. He threatens the landlords and workers and they dont want go to to work." Pt verbally aggressive in EMS bay, stating he will jump out the window. Pt not answering questions regarding thoughts of SI/HI/AH/VH. Pt rambling speech     58 y/o male presents to ED under OPC for psychiatric evaluation. Pt denies any hallucinations. Pt in room states "he's planting bugs to cross conversations." Unclear patient has a history of bipolar disorder.  He is vague in answering the question 1 point states no at another point says yes.      Per OPC: "Pt has been manic and paranoid about people living around him. He is screaming at other residents. He threatens the landlord and workers, and they don't want to go to work".     PEC time: 1926    The history is provided by the patient and medical records. No  was used.     Review of patient's allergies indicates:  No Known Allergies  Past Medical History:   Diagnosis Date    Joint pain     Unspecified urinary incontinence      Past Surgical History:   Procedure Laterality Date    APPENDECTOMY       No family history on file.  Social History     Tobacco Use    Smoking status: Every Day     Current packs/day: 1.00     Types: Cigarettes     Passive exposure: Never    Smokeless tobacco: Never    Tobacco comments:     1 pkg/day   Substance Use Topics    Alcohol use: Not Currently    Drug use: Not Currently     Review of Systems   Constitutional:  Negative for chills and fever.   Respiratory:  Negative for cough and shortness of breath.    Cardiovascular:  " "Negative for chest pain.   Gastrointestinal:  Negative for abdominal pain, nausea and vomiting.   Musculoskeletal:  Negative for myalgias.   Neurological:  Negative for syncope.   Psychiatric/Behavioral:  Positive for behavioral problems. Negative for hallucinations.    All other systems reviewed and are negative.      Physical Exam     Initial Vitals [04/02/24 1915]   BP Pulse Resp Temp SpO2   (!) 140/105 97 19 97 °F (36.1 °C) 99 %      MAP       --         Physical Exam    Constitutional: He appears well-developed and well-nourished. No distress.   HENT:   Head: Normocephalic and atraumatic.   Cardiovascular:  Normal rate.           Pulmonary/Chest: No respiratory distress. He has no wheezes. He has no rhonchi. He exhibits no tenderness.   Abdominal: Abdomen is soft. He exhibits no distension. There is no abdominal tenderness. There is no rebound and no guarding.   Musculoskeletal:         General: Normal range of motion.     Neurological: He is alert and oriented to person, place, and time. He has normal strength.   Skin: Skin is warm and dry.   Psychiatric: His affect is labile. His speech is rapid and/or pressured.   Intense stare.   Loud and pressured speech.   Pt states "he's planting bugs to cross conversations"         ED Course   Procedures  Labs Reviewed   URINALYSIS, REFLEX TO URINE CULTURE - Abnormal; Notable for the following components:       Result Value    Blood, UA Trace (*)     Mucous, UA Trace (*)     RBC, UA 6-10 (*)     All other components within normal limits   ACETAMINOPHEN LEVEL - Abnormal; Notable for the following components:    Acetaminophen Level <17.4 (*)     All other components within normal limits   COMPREHENSIVE METABOLIC PANEL - Abnormal; Notable for the following components:    Glucose Level 106 (*)     All other components within normal limits   DRUG SCREEN, URINE (BEAKER) - Abnormal; Notable for the following components:    Cocaine, Urine Positive (*)     All other components " within normal limits    Narrative:     Cut off concentrations:    Amphetamines - 1000 ng/ml  Barbiturates - 200 ng/ml  Benzodiazepine - 200 ng/ml  Cannabinoids (THC) - 50 ng/ml  Cocaine - 300 ng/ml  Fentanyl - 1.0 ng/ml  MDMA - 500 ng/ml  Opiates - 300 ng/ml   Phencyclidine (PCP) - 25 ng/ml    Specimen submitted for drug analysis and tested for pH and specific gravity in order to evaluate sample integrity. Suspect tampering if specific gravity is <1.003 and/or pH is not within the range of 4.5 - 8.0  False negatives may result form substances such as bleach added to urine.  False positives may result for the presence of a substance with similar chemical structure to the drug or its metabolite.    This test provides only a PRELIMINARY analytical test result. A more specific alternate chemical method must be used in order to obtain a confirmed analytical result. Gas chromatography/mass spectrometry (GC/MS) is the preferred confirmatory method. Other chemical confirmation methods are available. Clinical consideration and professional judgement should be applied to any drug of abuse test result, particularly when preliminary positive results are used.    Positive results will be confirmed only at the physicians request. Unconfirmed screening results are to be used only for medical purposes (treatment).        ALCOHOL,MEDICAL (ETHANOL) - Normal   TSH - Normal   COVID/FLU A&B PCR - Normal    Narrative:     The Xpert Xpress SARS-CoV-2/FLU/RSV plus is a rapid, multiplexed real-time PCR test intended for the simultaneous qualitative detection and differentiation of SARS-CoV-2, Influenza A, Influenza B, and respiratory syncytial virus (RSV) viral RNA in either nasopharyngeal swab or nasal swab specimens.         CBC W/ AUTO DIFFERENTIAL    Narrative:     The following orders were created for panel order CBC auto differential.  Procedure                               Abnormality         Status                     ---------                                -----------         ------                     CBC with Differential[[id number]]                            Final result                 Please view results for these tests on the individual orders.   CBC WITH DIFFERENTIAL          Imaging Results    None          Medications   diphenhydrAMINE injection 50 mg (50 mg Intramuscular Given 4/2/24 1929)   LORazepam injection 2 mg (2 mg Intramuscular Given 4/2/24 1929)   ziprasidone injection 20 mg (20 mg Intramuscular Given 4/2/24 1929)   sterile water for injection injection ( Intramuscular Given 4/2/24 1929)             Scribe Attestation:   Scribe #1: I performed the above scribed service and the documentation accurately describes the services I performed. I attest to the accuracy of the note.    Attending Attestation:           Physician Attestation for Scribe:  Physician Attestation Statement for Scribe #1: I, Alex Leonard MD, reviewed documentation, as scribed by Chris Henson in my presence, and it is both accurate and complete.         Medical Decision Making  The differential diagnosis includes, but is not limited to, merlyn, acute psychosis, and substance abuse.     Sympathomimetic abuse, acute psychosis, merlyn/bipolar disorder.  Patient has had aggressive behavior arrived to the emergency department handcuffs threatening landlord threatening workers workers afraid to go to the apartment complex because he was following the around and threatening them.  I placed him under a pec for being gravely disabled potentially threat to other people.  Male be a side effect of cocaine abuse may have underlying bipolar disorder but is not clear based on my review of the records    Problems Addressed:  Acute psychosis: acute illness or injury that poses a threat to life or bodily functions  Cocaine abuse: chronic illness or injury with exacerbation, progression, or side effects of treatment    Amount and/or Complexity of Data Reviewed  External  "Data Reviewed: notes.     Details: Per OPC: "Pt has been manic and paranoid about people living around him. He is screaming at other residents. He threatens the landlord and workers, and they don't want to go to work".     Labs: ordered. Decision-making details documented in ED Course.            ED Course as of 04/02/24 2044 Tue Apr 02, 2024 1926 PEC time 1926   [MALIKA]      ED Course User Index  [MALIKA] TreydiannesunshineSunilde       Medically cleared for psychiatry placement: 4/2/2024  8:41 PM                   Clinical Impression:  Final diagnoses:  [F19.94] Substance induced mood disorder (Primary)  [F23] Acute psychosis  [R46.89] Threatening behavior  [F14.10] Cocaine abuse          ED Disposition Condition    Transfer to Psych Facility Stable          ED Prescriptions    None       Follow-up Information    None          Alex Leonard MD  04/02/24 2044    "

## 2024-04-03 NOTE — ASSESSMENT & PLAN NOTE
59-YEAR-OLD WHITE MALE WITH A HISTORY OF BIPOLAR ILLNESS THIS TIME WE WILL BE BEGUN ON TRIALS OF CLONAZEPAM TO TRY TO MITIGATE DIFFICULTIES IN TERMS OF ANXIETY AS WELL AS BEGUN ON TRIALS OF DEPAKOTE.  PATIENT HAS A HISTORY OF WHICH HE WAS PREVIOUSLY BEEN RESISTANT TO MEDICATION TRIALS HOPEFULLY HE WILL COMPLY.    REQUEST THAT  GIVE ADDITIONAL COLLATERAL INFORMATION FROM FAMILY MEMBERS.

## 2024-04-03 NOTE — PROGRESS NOTES
Recreation Therapy Progress Note    Date: 4  3  2024    Time:  10:00 a.m.    Group Title:  Creative expression    Mood:  Patient anxious    Behavior:  Patient needed prompts to attend group and participate at minimum to moderate level.  One-to-one hands-on assistance needed to focused on assignment    Affect:  Anxious    Speech:  Normal    Cognition:  Alert but needed one-to-one assistance to stay focused on task.  Patient very hyper and anxious    Participation Level:  Patient participate in group 50% then requested to go lay down    Intervention:  Continue RT services          Recreation Therapist   Signature:  Anna Calvillo ma ctrs
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
## 2024-04-03 NOTE — NURSING
"  Admission Note:    Jayson Murcia is a 59 y.o. male, : 1965, MRN: [id number], admitted on 4/3/2024 to [facility] ([facility]) for Faisal Gonzales MD with a diagnosis of Psychosis [F29]. Patient admitted on a status of Physician Emergency Certificate (PEC). Jayson reports no known food or drug allergies.    Jayson was OPC'd by a  per the OPC.  Jayson believes this is his .  He is paranoid with the beliefs that someone at his complex is planting bugs in his apartment.  He was verbally aggressive in the ER and received Ativan, Benadryl, and Geodon injection for behaviors.  He denies auditory and visual hallucinations but is noted to be responding to internal.  He states he lives in his apartment alone. He is single and has never been . Has no children, but states he has a sister in [place] named Yesica Khan.  No relatives or friends contacted by patient due to patient's refusal to contact family or friends.    Patient demonstrated an affect that was irritable. Patient demonstrated mood during assessment that was angry. Patient had an appearance that was clean. Patient denies suicidal ideation. Patient denies suicide plan. Patient denies homicidal ideations but continues to make threats of harm once he finds out who has him here.  Educated patient on the PEC process and unit rules.      Jayson's  height is 5' 9" (1.753 m) and weight is 75.1 kg (165 lb 9.1 oz). His temperature is 97.8 °F (36.6 °C). His blood pressure is 124/77 and his pulse is 75. His respiration is 20. Jayson's last BM was noted on 24.    Metal detector screening performed via security personnel. The result of the scan was Jayson having over $1300 in his wallet and geovanna pockets collectively.  All belongings were inventoried in front of Mr. Murcia.  Head-to-toe physical assessment completed with the following findings: multiple tattoos found upon body screen. A full skin assessment was " performed. Jayson's skin appeared dry and warm.  Jayson was oriented to unit, staff, peers, and room. Patient belongings/valuables stored in locked intake room cabinet and changes of clothing provided to patient. Jayson was placed on Q 15 min observations.

## 2024-04-03 NOTE — PLAN OF CARE
Problem: Violence Risk or Actual  Goal: Anger and Impulse Control  4/3/2024 0324 by Maricel Flower RN  Outcome: Ongoing, Not Progressing  4/3/2024 0323 by Maricel Flower RN  Outcome: Ongoing, Not Progressing  Intervention: Promote Self-Control  Flowsheets (Taken 4/3/2024 0324)  Supportive Measures:   active listening utilized   positive reinforcement provided  Environmental Support: calm environment promoted     Problem: Adult Behavioral Health Plan of Care  Goal: Plan of Care Review  4/3/2024 0324 by Maricel Flower RN  Outcome: Ongoing, Not Progressing  4/3/2024 0323 by Maricel Flower RN  Outcome: Ongoing, Not Progressing  Goal: Patient-Specific Goal (Individualization)  4/3/2024 0324 by Maricel Flower RN  Outcome: Ongoing, Not Progressing  4/3/2024 0323 by Maricel Flower RN  Outcome: Ongoing, Not Progressing  Goal: Adheres to Safety Considerations for Self and Others  4/3/2024 0324 by Maricel Flower RN  Outcome: Ongoing, Not Progressing  4/3/2024 0323 by Maricel Flower RN  Outcome: Ongoing, Not Progressing  Intervention: Develop and Maintain Individualized Safety Plan  Flowsheets (Taken 4/3/2024 0145)  Safety Measures: suicide assessment completed  Goal: Absence of New-Onset Illness or Injury  4/3/2024 0324 by Maricel Flower RN  Outcome: Ongoing, Not Progressing  4/3/2024 0323 by Maricel Flower RN  Outcome: Ongoing, Not Progressing  Intervention: Identify and Manage Fall Risk  Flowsheets (Taken 4/3/2024 0145)  Safety Measures: suicide assessment completed  Goal: Optimized Coping Skills in Response to Life Stressors  4/3/2024 0324 by Maricel Flower RN  Outcome: Ongoing, Not Progressing  4/3/2024 0323 by Maricel Flower RN  Outcome: Ongoing, Not Progressing  Intervention: Promote Effective Coping Strategies  Flowsheets (Taken 4/3/2024 0145)  Supportive Measures:   active listening utilized   positive reinforcement provided  Goal: Develops/Participates in Therapeutic Reading to Support  Successful Transition  4/3/2024 0324 by Maricel Flower RN  Outcome: Ongoing, Not Progressing  4/3/2024 0323 by Maricel Flower RN  Outcome: Ongoing, Not Progressing  Goal: Rounds/Family Conference  4/3/2024 0324 by Maricel Flower RN  Outcome: Ongoing, Not Progressing  4/3/2024 0323 by Maricel Flower RN  Outcome: Ongoing, Not Progressing     Problem: Activity and Energy Impairment (Excessive Substance Use)  Goal: Optimized Energy Level (Excessive Substance Use)  4/3/2024 0324 by Maricel Flower RN  Outcome: Ongoing, Not Progressing  4/3/2024 0323 by Maricel Flower RN  Outcome: Ongoing, Not Progressing  Intervention: Optimize Energy Level  Flowsheets (Taken 4/3/2024 0145)  Activity (Behavioral Health): up ad sonny     Problem: Behavior Regulation Impairment (Excessive Substance Use)  Goal: Improved Behavioral Control (Excessive Substance Use)  4/3/2024 0324 by Maricel Flower RN  Outcome: Ongoing, Not Progressing  4/3/2024 0323 by Maricel Flower RN  Outcome: Ongoing, Not Progressing     Problem: Decreased Participation and Engagement (Excessive Substance Use)  Goal: Increased Participation and Engagement (Excessive Substance Use)  4/3/2024 0324 by Maricel Flower RN  Outcome: Ongoing, Not Progressing  4/3/2024 0323 by Maricel Flower RN  Outcome: Ongoing, Not Progressing     Problem: Physiologic Impairment (Excessive Substance Use)  Goal: Improved Physiologic Symptoms (Excessive Substance Use)  4/3/2024 0324 by Maricel Flower RN  Outcome: Ongoing, Not Progressing  4/3/2024 0323 by Maricel Flower RN  Outcome: Ongoing, Not Progressing     Problem: Social, Occupational or Functional Impairment (Excessive Substance Use)  Goal: Enhanced Social, Occupational or Functional Skills (Excessive Substance Use)  4/3/2024 0324 by Maricel Flower RN  Outcome: Ongoing, Not Progressing  4/3/2024 0323 by Maricel Flower RN  Outcome: Ongoing, Not Progressing

## 2024-04-03 NOTE — PSYCH EVALUATION
Behavioral Health Unit  Psychosocial History and Assessment  Progress Note      Patient Name: Jayson Murcia YOB: 1965 SW: Jorge Vincentmary, [facility] Date: 4/3/2024    Chief Complaint: mood elevation, psychosis, and behavior    Consent:     Did the patient consent for an interview with the ? Yes    Did the patient consent for the  to contact family/friend/caregiver?   No    Did the patient give consent for the  to inform family/friend/caregiver of his/her whereabouts or to discuss discharge planning? No    Source of Information: Face to face with patient and Chart review    Is information obtained from interviews considered reliable?   no    Reason for Admission:     There are no hospital problems to display for this patient.      History of Present Illness - (Patient Perception):   Pt admitted due to erratic and aggressive behavior at apartment complex he lives at.  Threatening others, paranoid.  Pt not really cooperative with interview and had to be redirected.  Quick to anger and talking nonsense with periods of clarity.  Pt demanding to leave.  I suggested that his aggression would not accomplish that goal.    History of Present Illness - (Perception of Others): unknown     Present biopsychosocial functioning: low    Past biopsychosocial functioning: Pt reports a history of higher function and working    Family and Marital/Relationship History:     Significant Other/Partner Relationships:  Single:  no children    Family Relationships: Strained      Childhood History:     Where was patient raised? [place]    Who raised the patient? parents      How does patient describe their childhood?       Who is patient's primary support person? friend      Culture and Hindu:     Hindu: Yarsani    How strong of a role does Denominational and spirituality play in patient's life? none    Episcopal or spiritual concerns regarding treatment: not applicable     History of Abuse:  "  History of Abuse: Denies      Outcome:     Psychiatric and Medical History:     History of psychiatric illness or treatment:  Pt states only that he was in [facility] many years ago.  Denies current provider.  Available [facility] records do not reveal any previous admissions in our system.  He was seen by Dr Prakash for behavioral health in 2023 on an outpatient basis.    Medical history:   Past Medical History:   Diagnosis Date    Joint pain     Unspecified urinary incontinence        Substance Abuse History:     Alcohol - (Patient Perspective):   Social History     Substance and Sexual Activity   Alcohol Use Not Currently       Alcohol - (Collateral Perspective): "I have a drink" according to patient    Drugs - (Patient Perspective):   Social History     Substance and Sexual Activity   Drug Use Not Currently       Drugs - (Collateral Perspective): cocaine according to patient.  Amount and frequency unknown    Additional Comments:     Education:     Currently Enrolled? No  High School (9-12) or GED  Pt states he cannot read or write    Special Education? Unknown    Interested in Completing Education/GED: No    Employment and Financial:     Currently employed? disabled    Source of Income: SSD    Able to afford basic needs (food, shelter, utilities)? Yes    Who manages finances/personal affairs? patient      Service:     [place]? no    Combat Service? No     Community Resources:     Describe present use of community resources: unknown     Identify previously used community resources   (Include previous mental health treatment - outpatient and inpatient): Pt only reports one admission to rehab several years ago    Environmental:     Current living situation:Lives alone    Social Evaluation:     Patient Assets: General fund of knowledge    Patient Limitations: poor coping skills, drug use, anger issues    High risk psychosocial issues that may impact discharge planning:   None.  May have a housing issue due " to behaviors    Recommendations:     Anticipated discharge plan:   home    High risk issues requiring early treatment planning and immediate intervention: risk of relapse    Community resources needed for discharge planning:  aftercare treatment sources    Anticipated social work role(s) in treatment and discharge planning:  will offer advice and  as well as group therapy 4x per week and individual as necessary.   will assist with discharge planning and referrals to aftercare resources.
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
## 2024-04-03 NOTE — ASSESSMENT & PLAN NOTE
Patient was chronic pattern nonadherence.  Patient was need education on importance of compliance and follow up moving forward.  Patient was need to be considered for long-acting injectable if he continues to refuse oral medications.

## 2024-04-03 NOTE — PLAN OF CARE
Problem: Violence Risk or Actual  Goal: Anger and Impulse Control  Intervention: Minimize Safety Risk  Flowsheets (Taken 4/3/2024 0845)  Behavior Management:   boundaries reinforced   impulse control promoted  Sensory Stimulation Regulation:   quiet environment promoted   lighting decreased     Problem: Adult Behavioral Health Plan of Care  Goal: Adheres to Safety Considerations for Self and Others  Intervention: Develop and Maintain Individualized Safety Plan  Flowsheets (Taken 4/3/2024 0845)  Safety Measures:   environmental rounds completed   safety rounds completed  Goal: Absence of New-Onset Illness or Injury  Intervention: Identify and Manage Fall Risk  Flowsheets (Taken 4/3/2024 0845)  Safety Measures:   environmental rounds completed   safety rounds completed  Intervention: Prevent Infection  Flowsheets (Taken 4/3/2024 0845)  Infection Prevention:   hand hygiene promoted   rest/sleep promoted  Goal: Develops/Participates in Therapeutic [place] to Support Successful Transition  Intervention: Mutually Develop Transition Plan  Flowsheets (Taken 4/3/2024 0845)  Current Discharge Risk:   lives alone   psychiatric illness   substance use/abuse  Outpatient/Agency/Support Group Needs:   outpatient counseling   outpatient medication management   outpatient psychiatric care (specify)   outpatient substance abuse treatment (specify)  Transition Support: follow-up care discussed  Anticipated Discharge Disposition: home or self-care  Patient/Family Anticipated Services at Transition:   mental health services   outpatient care   rehabilitation services  Patient/Family Anticipates Transition to: home  Concerns to be Addressed: denies needs/concerns at this time     Problem: Activity and Energy Impairment (Excessive Substance Use)  Goal: Optimized Energy Level (Excessive Substance Use)  Intervention: Optimize Energy Level  Flowsheets (Taken 4/3/2024 0845)  Activity (Behavioral Health):   activity encouraged   up ad  sonny  Diversional Activity: television     Problem: Behavior Regulation Impairment (Excessive Substance Use)  Goal: Improved Behavioral Control (Excessive Substance Use)  Intervention: Promote Behavior and Impulse Control  Flowsheets (Taken 4/3/2024 0845)  Behavior Management:   boundaries reinforced   impulse control promoted     Problem: Decreased Participation and Engagement (Excessive Substance Use)  Goal: Increased Participation and Engagement (Excessive Substance Use)  Intervention: Facilitate Participation and Engagement  Flowsheets (Taken 4/3/2024 0845)  Diversional Activity: television     Problem: Physiologic Impairment (Excessive Substance Use)  Goal: Improved Physiologic Symptoms (Excessive Substance Use)  Intervention: Optimize Physiologic Function  Flowsheets (Taken 4/3/2024 0845)  Complementary Therapy:   art therapy provided   music therapy provided     Problem: Social, Occupational or Functional Impairment (Excessive Substance Use)  Goal: Enhanced Social, Occupational or Functional Skills (Excessive Substance Use)  Intervention: Promote Social, Occupational and Functional Ability  Flowsheets (Taken 4/3/2024 0845)  Social Functional Ability Promotion:   autonomy promoted   self-expression encouraged   social interaction promoted
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
## 2024-04-03 NOTE — SUBJECTIVE & OBJECTIVE
Patient History               Medical as of 4/3/2024       Past Medical History       Diagnosis Date Comments Source    Bipolar disorder -- -- Provider    Hx of psychiatric care -- -- Provider    Joint pain -- -- Provider    Liseth -- -- Provider    Psychiatric problem -- -- Provider    PTSD (post-traumatic stress disorder) -- -- Provider    Sleep difficulties -- -- Provider    Substance abuse -- -- Provider    Unspecified urinary incontinence -- -- Provider                          Surgical as of 4/3/2024       Past Surgical History       Procedure Laterality Date Comments Source    APPENDECTOMY -- -- -- Provider                          Family as of 4/3/2024    None               Tobacco Use as of 4/3/2024       Smoking Status Smoking Start Date Last Attempt to Quit Current Packs/Day Average Packs/Day    Every Day -- -- 1.0    Pack Year History   Packs/Day From To Years    1 -- -- 0.0      Passive Exposure    Never      Smokeless Status Smokeless Type Smokeless Quit Date    Never -- --      Tobacco Comments    1 pkg/day      Source    Provider                  Alcohol Use as of 4/3/2024       Alcohol Use Drinks/Week Alcohol/Week Comments Source    Not Currently   -- -- Provider                  Drug Use as of 4/3/2024       Drug Use Types Frequency Comments Source    Not Currently  Cocaine -- -- Provider                  Sexual Activity as of 4/3/2024       Sexually Active Birth Control Partners Comments Source    Yes -- -- -- Provider                  Activities of Daily Living as of 4/3/2024    None               Social Documentation as of 4/3/2024    59-year-old white male who currently lives in disabled housing sponsor by the [facility].  Patient was this time has no history of previously being .  He has no children.  He apparently has been disabled by his reports he was 2017.    Please note all history though he renders she will be considered questionable as he remains very elevated  expansive in terms of his presentation.    Source: Provider               Occupational as of 4/3/2024    None               Socioeconomic as of 4/3/2024       Marital Status Spouse Name Number of Children Years Education Education Level Preferred Language Ethnicity Race Source    Single -- -- -- -- English Not  or /a White --                  Pertinent History       Question Response Comments    Lives with alone --    Place in Birth Order -- --    Lives in other --    Number of Siblings -- --    Raised by biological parents --    Legal Involvement none --    Childhood Trauma uneventful --    Criminal History of none --    Financial Status disabled --    Highest Level of Education unfinished highschool --    Does patient have access to a firearm? No --     Service No --    Primary Leisure Activity -- --    Spirituality -- --          Past Medical History:   Diagnosis Date    Bipolar disorder     Hx of psychiatric care     Joint pain     Liseth     Psychiatric problem     PTSD (post-traumatic stress disorder)     Sleep difficulties     Substance abuse     Unspecified urinary incontinence      Past Surgical History:   Procedure Laterality Date    APPENDECTOMY       Family History    None       Tobacco Use    Smoking status: Every Day     Current packs/day: 1.00     Types: Cigarettes     Passive exposure: Never    Smokeless tobacco: Never    Tobacco comments:     1 pkg/day   Substance and Sexual Activity    Alcohol use: Not Currently    Drug use: Not Currently     Types: Cocaine    Sexual activity: Yes     Review of patient's allergies indicates:  No Known Allergies    Current Facility-Administered Medications on File Prior to Encounter   Medication    [COMPLETED] diphenhydrAMINE injection 50 mg    [COMPLETED] LORazepam injection 2 mg    [COMPLETED] sterile water for injection injection    [COMPLETED] ziprasidone injection 20 mg     Current Outpatient Medications on File Prior to Encounter  "  Medication Sig    baclofen (LIORESAL) 10 MG tablet Take 1 tablet (10 mg total) by mouth 3 (three) times daily. for 10 days (Patient not taking: Reported on 9/13/2023)    HYDROcodone-acetaminophen (NORCO) 7.5-325 mg per tablet Take 1 tablet by mouth every 6 (six) hours as needed for Pain. (Patient not taking: Reported on 9/13/2023)    tamsulosin (FLOMAX) 0.4 mg Cap TAKE 1 CAPSULE(0.4 MG) BY MOUTH EVERY DAY (Patient not taking: Reported on 9/13/2023)     Psychotherapeutics (From admission, onward)      Start     Stop Route Frequency Ordered    04/03/24 0200  OLANZapine zydis disintegrating tablet 10 mg         -- Oral Nightly 04/03/24 0159    04/03/24 0159  LORazepam tablet 1 mg         -- Oral 4 times daily PRN 04/03/24 0159    04/03/24 0159  OLANZapine zydis disintegrating tablet 10 mg         -- Oral Every 8 hours PRN 04/03/24 0159          Review of Systems   Psychiatric/Behavioral:  Positive for agitation, behavioral problems, decreased concentration and sleep disturbance. The patient is nervous/anxious and is hyperactive.      Strengths and Liabilities:   Strengths: Verbal, adequate intellect , access to stable housing  Weaknesses:  Poor insight, poor adherence to medications in the past  Objective:     Vital Signs (Most Recent):  Temp: 97.7 °F (36.5 °C) (04/03/24 0614)  Pulse: 68 (04/03/24 0614)  Resp: 16 (04/03/24 0614)  BP: (!) 141/92 (04/03/24 0614)  SpO2: 98 % (04/03/24 0614) Vital Signs (24h Range):  Temp:  [97 °F (36.1 °C)-97.8 °F (36.6 °C)] 97.7 °F (36.5 °C)  Pulse:  [68-97] 68  Resp:  [14-20] 16  SpO2:  [97 %-99 %] 98 %  BP: (113-141)/() 141/92     Height: 5' 9" (175.3 cm)  Weight: 75.1 kg (165 lb 9.1 oz)  Body mass index is 24.45 kg/m².    No intake or output data in the 24 hours ending 04/03/24 4697       Physical Exam    Mental status examination:  59-year-old white male who has a extremely pressured speech he was affect was labile.  Whose mood was anxious restless.  His thought processes " were scattered and loose and showed evidence of flight of ideas.  His thought content demonstrated no clear evidence of any visual hallucinations.  There was no clear evidence of any active auditory hallucinations.  He continues to have evidence of inflated sense of health her appears to be grandiosity as well as paranoid process.  His insight and judgment this time were deemed to be limited to poor.      Cognitive evaluation he was alert and oriented to person, place, setting.  He was not fully oriented to time as he was very distractible this time.  He was not fully cooperate with the remainder of short-term memory testing as he was grossly inattentive and distracted.  Because of inattentiveness he was not asked to perform simple calculations serial threes he was clearly he was show evidence of deficit this is inattention process.  He was somewhat bizarre association.        Significant Labs: Last 72 Hours:   Recent Lab Results         04/02/24 1944 04/02/24 1929        Influenza A, Molecular Not Detected         Influenza B, Molecular Not Detected         Phencyclidine Negative         Albumin/Globulin Ratio   1.4       Acetaminophen Level   <17.4       Albumin   4.2       Alcohol, Serum   <10.0  Comment: This assay is performed for medical purposes only.       ALP   75       ALT   19       Amphetamines, Urine Negative         Appearance, UA Clear         AST   21       Bacteria, UA None Seen         Barbituates, Urine Negative         Baso #   0.05       Basophil %   0.6       Benzodiazepine, Urine Negative         BILIRUBIN TOTAL   0.4       Bilirubin, UA Negative         BUN   20.0       Calcium   10.2       Cannabinoids, Urine Negative         Chloride   104       CO2   25       Cocaine, Urine Positive         Color, UA Light-Yellow         Creatinine   1.09       eGFR   >60       Eos #   0.39       Eos %   4.5       Fentanyl, Urine Negative         Globulin, Total   2.9       Glucose   106       Glucose,  UA Normal         Hematocrit   44.7       Hemoglobin   15.3       Immature Grans (Abs)   0.02       Immature Granulocytes   0.2       Ketones, UA Negative         Leukocyte Esterase, UA Negative         Lymph #   2.84       LYMPH %   32.9       MCH   29.7       MCHC   34.2       MCV   86.6       MDMA, Urine Negative         Mono #   0.66       Mono %   7.7       MPV   9.7       Mucous, UA Trace         Neut #   4.66       Neut %   54.1       NITRITE UA Negative         nRBC   0.0       Blood, UA Trace         Opiates, Urine Negative         pH, UA 6.5         pH, Urine 6.5         Platelet Count   280       Potassium   4.3       PROTEIN TOTAL   7.1       Protein, UA Negative         RBC   5.16       RBC, UA 6-10         RDW   12.4       SARS-CoV2 (COVID-19) Qualitative PCR Not Detected         Sodium   140       Specific Gravity,UA 1.019         Specific Gravity, Urine Auto 1.019         Squamous Epithelial Cells, UA None Seen         TSH   1.358       Urobilinogen, UA Normal         WBC, UA 0-5         WBC   8.62               Significant Imaging: None

## 2024-04-04 LAB
CHOLEST SERPL-MCNC: 144 MG/DL
CHOLEST/HDLC SERPL: 3 {RATIO} (ref 0–5)
GLUCOSE P FAST SERPL-MCNC: 111 MG/DL (ref 70–100)
HDLC SERPL-MCNC: 55 MG/DL (ref 35–60)
LDLC SERPL CALC-MCNC: 59 MG/DL (ref 50–140)
T PALLIDUM AB SER QL: NONREACTIVE
TRIGL SERPL-MCNC: 149 MG/DL (ref 34–140)
VLDLC SERPL CALC-MCNC: 30 MG/DL

## 2024-04-04 PROCEDURE — 86780 TREPONEMA PALLIDUM: CPT | Performed by: PSYCHIATRY & NEUROLOGY

## 2024-04-04 PROCEDURE — 82947 ASSAY GLUCOSE BLOOD QUANT: CPT | Performed by: PSYCHIATRY & NEUROLOGY

## 2024-04-04 PROCEDURE — 11400000 HC PSYCH PRIVATE ROOM

## 2024-04-04 PROCEDURE — 25000003 PHARM REV CODE 250: Performed by: PSYCHIATRY & NEUROLOGY

## 2024-04-04 PROCEDURE — 80061 LIPID PANEL: CPT | Performed by: PSYCHIATRY & NEUROLOGY

## 2024-04-04 RX ADMIN — CLONAZEPAM 0.5 MG: 0.5 TABLET ORAL at 08:04

## 2024-04-04 RX ADMIN — OLANZAPINE 10 MG: 10 TABLET, ORALLY DISINTEGRATING ORAL at 08:04

## 2024-04-04 RX ADMIN — CLONAZEPAM 0.5 MG: 0.5 TABLET ORAL at 02:04

## 2024-04-04 RX ADMIN — TAMSULOSIN HYDROCHLORIDE 0.4 MG: 0.4 CAPSULE ORAL at 08:04

## 2024-04-04 RX ADMIN — DIVALPROEX SODIUM 1000 MG: 500 TABLET, FILM COATED, EXTENDED RELEASE ORAL at 08:04

## 2024-04-04 NOTE — PLAN OF CARE
Problem: Violence Risk or Actual  Goal: Anger and Impulse Control  Outcome: Ongoing, Progressing  Intervention: Minimize Safety Risk  Flowsheets (Taken 4/4/2024 0945)  Behavior Management:   impulse control promoted   boundaries reinforced  Sensory Stimulation Regulation:   quiet environment promoted   lighting decreased     Problem: Adult Behavioral Health Plan of Care  Goal: Plan of Care Review  Outcome: Ongoing, Progressing  Goal: Patient-Specific Goal (Individualization)  Outcome: Ongoing, Progressing  Goal: Adheres to Safety Considerations for Self and Others  Outcome: Ongoing, Progressing  Goal: Absence of New-Onset Illness or Injury  Outcome: Ongoing, Progressing  Goal: Optimized Coping Skills in Response to Life Stressors  Outcome: Ongoing, Progressing  Goal: Develops/Participates in Therapeutic [place] to Support Successful Transition  Outcome: Ongoing, Progressing  Intervention: Mutually Develop Transition Plan  Flowsheets (Taken 4/4/2024 0945)  Outpatient/Agency/Support Group Needs:   outpatient counseling   outpatient medication management   outpatient psychiatric care (specify)   outpatient substance abuse treatment (specify)  Anticipated Discharge Disposition: home or self-care  Patient/Family Anticipated Services at Transition:   mental health services   outpatient care   rehabilitation services  Goal: Rounds/Family Conference  Outcome: Ongoing, Progressing     Problem: Activity and Energy Impairment (Excessive Substance Use)  Goal: Optimized Energy Level (Excessive Substance Use)  Outcome: Ongoing, Progressing     Problem: Behavior Regulation Impairment (Excessive Substance Use)  Goal: Improved Behavioral Control (Excessive Substance Use)  Outcome: Ongoing, Progressing  Intervention: Promote Behavior and Impulse Control  Flowsheets (Taken 4/4/2024 0945)  Behavior Management:   impulse control promoted   boundaries reinforced     Problem: Decreased Participation and Engagement (Excessive Substance  Use)  Goal: Increased Participation and Engagement (Excessive Substance Use)  Outcome: Ongoing, Progressing     Problem: Physiologic Impairment (Excessive Substance Use)  Goal: Improved Physiologic Symptoms (Excessive Substance Use)  Outcome: Ongoing, Progressing  Intervention: Optimize Physiologic Function  Flowsheets (Taken 4/4/2024 0945)  Complementary Therapy:   art therapy provided   music therapy provided     Problem: Social, Occupational or Functional Impairment (Excessive Substance Use)  Goal: Enhanced Social, Occupational or Functional Skills (Excessive Substance Use)  Outcome: Ongoing, Progressing  Flowsheets (Taken 4/4/2024 0945)  Mutually Determined Action Steps (Enhanced Social, Occupational or Functional Skills): other (see comments)  Individualized Action Step (Enhanced Social, Occupational or Functional Skills): to get along with others  Intervention: Promote Social, Occupational and Functional Ability  Flowsheets (Taken 4/4/2024 0945)  Social Functional Ability Promotion:   autonomy promoted   self-expression encouraged   social interaction promoted

## 2024-04-04 NOTE — PROGRESS NOTES
Recreation Therapy Progress Note    Date:  04/04/2024    Time:  10:00 a.m.    Group Title:  Creative expression    Mood:  Anxious    Behavior:  Patient anxious participate in group for 10 minutes and then left.  Patient stated he wanted to sleep .Patient came to the in group and participate in exercise outside for another 5 minutes    Affect:  Patient anxious    Speech:  Normal in rambling at intervals    Cognition:  Alert but distracted at intervals    Participation Level:  Patient participated 50% in group left group x2 then came back    Intervention:  Continue RT services continue to motivate patient to attend group          Recreation Therapist   Signature:  Anna Calvillo ma ctrs

## 2024-04-04 NOTE — NURSING
AAO. All meds accepted. Denies any SI/HI/AH/VH/Depression/Anxiety. Pt in room resting quietly.   Statement Selected

## 2024-04-04 NOTE — NURSING
"Daily Nursing Note:      Behavior:    Patient (Jayson Murcia is a 59 y.o. male, : 1965, MRN: [id number]) demonstrating an affect that was flat and irritable. Jayson demonstrating mood that is depressed and anxious. Jayson had an appearance that was clean. Jayson denies suicidal ideation. Jayson denies suicide plan. Jayson denies homicidal ideation. Jayson denies hallucinations.    Jayson's  height is 5' 9" (1.753 m) and weight is 75.1 kg (165 lb 9.1 oz). His temperature is 98.2 °F (36.8 °C). His blood pressure is 108/77 and his pulse is 85. His respiration is 20 and oxygen saturation is 98%.     Jayson's last BM was noted on: 2024. Jayson was lying in bed, resting.       Intervention:    Encourage Jayson to perform self-hygiene, grooming, and changing of clothing. Monitor Jayson's behavior and program compliance. Monitor Jayson for suicidal ideation, homicidal ideation, sleep disturbance, and hallucinations. Encourage Jayson to eat all portions of meals and assess for meal preferences. Monitor Jayson for intake and output to ensure hydration. Notify the Physician for any medication refusal and any change in patient condition.      Response:    Jayson verbalizes understand of unit process and procedures. Jayson has been compliant with medications.      Plan:     Continue to monitor per MD orders; maintain patient safety. Q15min safety checks.  "

## 2024-04-04 NOTE — PROGRESS NOTES
Hospital day number 1.      59-year-old white male with a history of bipolar affective disorder who at this time was admitted to the inpatient setting here involuntarily.  Patient was started on trials of Depakote, Klonopin, olanzapine with fairly good initial response.  Today he was far, he was better able to cooperate with the interview process.      Patient was intensity overall paranoia appeared to be a little bit better encapsulated today.  Under stress he could get a little bit loose and little bit more pressured speech but he did far better than he did in the past 24 hours.      He reports he did not sleep all that well last night.  Staff reports there has been no significant behavioral disturbances on the unit.      On mental status examination:  59-year-old white male whose affect is still fairly broad and expansive.  Whose speech was with good articulation when produced could be understood he was not nearly as pressured as it was yesterday.  His thought content demonstrated no evidence of any visual hallucinations.  There continued to be some elements of paranoia and suspiciousness.  He denied any visual hallucinations.  There was no clear evidence of any auditory hallucinations.  There continued to be some inflated sense of self and grandiosity.  He remains a little bit intrusive.  His insight and judgment deemed to be limited.  Orientation was intact.      Impression:  Bipolar affective disorder most recent episode manic with psychotic features  Cocaine use versus cocaine use disorder    Estimated continued stay greater than 6 hours     Indications:  Patient was time presents ongoing she was merlyn high degrees of impulsivity and has not achieved adequate stabilization clear and perceptions at this time     Recommendations:  1. Continue trials of Klonopin 0.5 mg p.o. t.i.d..  Overall goal should be to wean this down as we get control overall impulses mood was loading doses of Depakote and olanzapine has  been accomplished.    2. Continue trials of Depakote ER 1000 mg p.o. q.h.s.   3. Continue trials of olanzapine 10 mg p.o. q.h.s.   4. We will reassess re-evaluate.  Fortunately does how this patient was will be able to return back to his previous living environment.  Overall goals will be to transition him to another supervised living apartment however the good news that he can return to his previous place of residence if he complies with medications and follows through with the intensive outpatient level of services.  He was

## 2024-04-04 NOTE — PROGRESS NOTES
HCharlineD. # 1    59-year-old white male has a history of bipolar affective disorder who at this time presents to this facility involuntarily.  Patient this time was admitted after apparently becoming increasingly agitated and elevated apartment.      Patient was started on trials of Klonopin, olanzapine and Depakote.  Patient was applied over the past 24 hours.    On interview process today he was more settled.  He shows evidence of better encapsulation in his paranoia.      On mental status examination:  59-year-old white male who is pressured speech but far less than yesterday.  His thought content demonstrated no evidence of any visual hallucinations.  There was no auditory hallucinations.  There continued to be evidence of a paranoid process.  He made no statements to harm self.  He made no statements this time to harm others.  His insight and judgment this time were deemed to be limited.  On cognitive evaluation he was alert and oriented situation setting.    Impression bipolar affective disorder most recent episode manic with psychotic features   Cocaine use versus cocaine use disorder    Estimated continued hospitalization greater than 96 hours     Indications:  Patient was time presents ongoing challenges in terms of mood and impulse in his not achieved adequate stabilization remains with evidence of psychosis     Recommendations:  1. Continue trials of Depakote ER 1000 mg p.o. q.h.s.  2. Continue trials of harm 0.5 mg p.o. t.i.d. with the overall goals to transition patient was lower doses of the forthcoming days.    3. We will continue with trials of Zyprexa 10 mg p.o. q.h.s.   4. We will continue to work with patient was to establish patient was back in his residence.  Overall goals will be to try to achieve the disposition.  It does sound as if apartment management will allow him to return if he comply with medications participate in intensive outpatient level of services.

## 2024-04-04 NOTE — PROGRESS NOTES
Recreation Therapy Progress Note    Date: 4 4 2024    Time:  1400    Group Title:  Leisure skills    Mood:  Less anxious less withdrawn less guarded    Behavior:  Patient was willing to attend recreation group.  Patient participated minimal level was guarded.    Affect:  Anxious less today and guarded less today    Speech:  Patient quiet needs prompts to interact in conversation    Cognition:  Patient was not able to focused on assignment but agreed to stay in group    Participation Level:  Patient stayed in group 100% was not able to complete assignments but was able to get patient to interact and socialize at minimum level.    Intervention:  Continue RT services motivate patient to attend groups and complete assignments        Recreation Therapist   Signature:  Anna Calvillo MA CTRS

## 2024-04-04 NOTE — GROUP NOTE
"Education Group    Group Focus: Promoting Healthy Lifestyles      Number of patients in attendance: 1    Group Start Time: 1130  Group End Time:  1215  Groups Date: 4/4/2024  Group Topic:  Behavioral Health  Group Department: [facility]  Group Facilitators:  Jazmine Nayak LPN  _____________________________________________________________________    Patient Name: Jayson Murcia  MRN: [id number]  Patient Class: IP- Psych   Admission Date\Time: 4/3/2024  1:15 AM  Hospital Length of Stay: 1  Primary Care Provider: Casi Delgado MD     Referred by: Behavioral Medicine Unit Treatment Team     Target symptoms: Mood Disorder     Patient's response to treatment: Self-disclosure and Frequent Questions     Progress toward goals: Progressing well     Interval History: talkative. Good participation . " I like to move around and stay health. "      Diagnosis: bipolar     Plan: Continue treatment on BMU    "

## 2024-04-04 NOTE — GROUP NOTE
Group Psychotherapy       Group Focus: Psychodynamic Group Psychotherapy      Number of patients in attendance: 1    Group Start Time: 0900  Group End Time:  0930  Groups Date: 4/4/2024  Group Topic:  Behavioral Health  Group Department: [facility]  Group Facilitators:  Jorge Wilson LCSW  _____________________________________________________________________    Patient Name: Jayson Murcia  MRN: [id number]  Patient Class: IP- Psych   Admission Date\Time: 4/3/2024  1:15 AM  Hospital Length of Stay: 1  Primary Care Provider: Casi eDlgado MD     Referred by: Behavioral Medicine Unit Treatment Team     Target symptoms: Mood Disorder     Patient's response to treatment: Not Participating     Progress toward goals: Not progressing     Interval History: Pt did not attend     Diagnosis:      Plan: Continue treatment on BMU

## 2024-04-04 NOTE — NURSING
"Daily Nursing Note:      Behavior:    Patient (Jayson Murcia is a 59 y.o. male, : 1965, MRN: [id number]) demonstrating an affect that was flat and  labile. Jayson demonstrating mood that is anxious. Jayson had an appearance that was disheveled. Jayson denies suicidal ideation. Jayson denies suicide plan. Jayson denies homicidal ideation. Jayson denies hallucinations.  Awake, alert, and oriented, speech is clear, polite but standoffish and reserved, withdrawn and isolative to his room, minimal interaction, will continue to monitor.      Jayson's  height is 5' 9" (1.753 m) and weight is 75.1 kg (165 lb 9.1 oz). His temperature is 98.2 °F (36.8 °C). His blood pressure is 133/77 and his pulse is 74. His respiration is 16 and oxygen saturation is 96%.       Intervention:    Encourage Jayson to perform self-hygiene, grooming, and changing of clothing. Monitor Jayson's behavior and program compliance. Monitor Jayson for suicidal ideation, homicidal ideation, sleep disturbance, and hallucinations. Encourage Jayson to eat all portions of meals and assess for meal preferences. Monitor Jayson for intake and output to ensure hydration. Notify the Physician/Physician Assistant/Advance Practice Registered Nurse (MD/PA/APRN) for any medication refusal and any change in patient condition.      Response:    Jayson verbalizes understand of unit process and procedures. Jayson is compliant with meds.      Plan:     Continue to monitor per MD/PA/APRN orders; maintain patient safety.  "

## 2024-04-05 PROCEDURE — 11400000 HC PSYCH PRIVATE ROOM

## 2024-04-05 PROCEDURE — 25000003 PHARM REV CODE 250: Performed by: PSYCHIATRY & NEUROLOGY

## 2024-04-05 RX ORDER — OLANZAPINE 5 MG/1
5 TABLET, ORALLY DISINTEGRATING ORAL DAILY
Status: DISCONTINUED | OUTPATIENT
Start: 2024-04-05 | End: 2024-04-09

## 2024-04-05 RX ORDER — CLONAZEPAM 0.5 MG/1
0.5 TABLET ORAL 2 TIMES DAILY
Status: DISCONTINUED | OUTPATIENT
Start: 2024-04-05 | End: 2024-04-07

## 2024-04-05 RX ADMIN — OLANZAPINE 10 MG: 10 TABLET, ORALLY DISINTEGRATING ORAL at 08:04

## 2024-04-05 RX ADMIN — CLONAZEPAM 0.5 MG: 0.5 TABLET ORAL at 08:04

## 2024-04-05 RX ADMIN — OLANZAPINE 5 MG: 5 TABLET, ORALLY DISINTEGRATING ORAL at 10:04

## 2024-04-05 RX ADMIN — LORAZEPAM 1 MG: 1 TABLET ORAL at 08:04

## 2024-04-05 RX ADMIN — OLANZAPINE 10 MG: 10 TABLET, ORALLY DISINTEGRATING ORAL at 01:04

## 2024-04-05 RX ADMIN — DIVALPROEX SODIUM 1000 MG: 500 TABLET, FILM COATED, EXTENDED RELEASE ORAL at 08:04

## 2024-04-05 RX ADMIN — LORAZEPAM 1 MG: 1 TABLET ORAL at 01:04

## 2024-04-05 RX ADMIN — TAMSULOSIN HYDROCHLORIDE 0.4 MG: 0.4 CAPSULE ORAL at 08:04

## 2024-04-05 NOTE — PROGRESS NOTES
Recreation Therapy Progress Note    Date: 4 5 2024    Time: 10:00 am group    Group Title: creative expression    Mood: irritable because he wants to go home now he states. Rt staff did 1;1 with him. Pt calmed down then came to group and did cognitive games with group    Behavior: irritable about wanting DC. Pt calmed down and did rt cognitive game     Affect: pt anxious  and focused on Discharge.     Speech: pt talking more calm with redirection from rt staff.     Cognition: pt able to focus on cognitive games with prompts     Participation Level: 90%    Intervention:cont rt services.           Recreation Therapist   Signature: lubna Calvillo MA CTRS

## 2024-04-05 NOTE — PLAN OF CARE
Problem: Violence Risk or Actual  Goal: Anger and Impulse Control  4/5/2024 1532 by Raquel Parra RN  Outcome: Ongoing, Not Progressing  4/5/2024 1530 by Raquel Parra RN  Outcome: Ongoing, Not Progressing  Intervention: Minimize Safety Risk  4/5/2024 1532 by Raquel Parra RN  Flowsheets (Taken 4/5/2024 1532)  Behavior Management:   behavioral plan reviewed   impulse control promoted  4/5/2024 1530 by Raquel Parra RN  Flowsheets (Taken 4/5/2024 1530)  Behavior Management:   behavioral plan developed   boundaries reinforced   impulse control promoted  Intervention: Promote Self-Control  4/5/2024 1532 by Raquel Parra RN  Flowsheets (Taken 4/5/2024 1532)  Supportive Measures:   active listening utilized   positive reinforcement provided   verbalization of feelings encouraged  4/5/2024 1530 by Raquel Parra RN  Flowsheets (Taken 4/5/2024 1530)  Supportive Measures:   active listening utilized   positive reinforcement provided   self-care encouraged   verbalization of feelings encouraged  Environmental Support:   calm environment promoted   distractions minimized     Problem: Activity and Energy Impairment (Excessive Substance Use)  Goal: Optimized Energy Level (Excessive Substance Use)  Outcome: Ongoing, Not Progressing  Intervention: Optimize Energy Level  Flowsheets (Taken 4/5/2024 1532)  Activity (Behavioral Health): activity encouraged     Problem: Behavior Regulation Impairment (Excessive Substance Use)  Goal: Improved Behavioral Control (Excessive Substance Use)  Outcome: Ongoing, Not Progressing  Intervention: Promote Behavior and Impulse Control  Flowsheets (Taken 4/5/2024 1532)  Behavior Management:   behavioral plan reviewed   impulse control promoted     Problem: Decreased Participation and Engagement (Excessive Substance Use)  Goal: Increased Participation and Engagement (Excessive Substance Use)  Outcome: Ongoing, Not Progressing  Intervention: Facilitate Participation and Engagement  Flowsheets  (Taken 4/5/2024 1532)  Supportive Measures:   active listening utilized   positive reinforcement provided   verbalization of feelings encouraged     Problem: Physiologic Impairment (Excessive Substance Use)  Goal: Improved Physiologic Symptoms (Excessive Substance Use)  Outcome: Ongoing, Not Progressing     Problem: Social, Occupational or Functional Impairment (Excessive Substance Use)  Goal: Enhanced Social, Occupational or Functional Skills (Excessive Substance Use)  Outcome: Ongoing, Not Progressing  Intervention: Promote Social, Occupational and Functional Ability  Flowsheets (Taken 4/5/2024 1532)  Trust Relationship/Rapport:   care explained   emotional support provided   thoughts/feelings acknowledged

## 2024-04-05 NOTE — PROGRESS NOTES
"2024 9:37 AM   Name: Jayson Murcia   : 1965   MRN: [id number]   [facility] Progress Note     SUBJECTIVE:   Pt seen and chart reviewed, received update from staff. Pt is new to me, received clinical update from staff and reviewed notes by Dr. Gonzales. Briefly, pt was admitted on [facility] for manic behaviors and paranoid delusions, agressiveness. UDS was + for cocaine. 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 Was agitated in ED and has been somewhat irritable and impulsive on unit, but redirectable for the most part. For interview, pt presents with very intense affect, loud speech with abrasive tone, which is a pressured and rambling at times. Affect is overall labile, appearing agitated at times then calm others. He continues to exhibit evidence of hypervigilance and mild paranoia, volunteering some paranoid ideas intermittently. States he does feel that the medications are helping to "calm [his] nerves" so far. Denies any s/e or AE of med regimen. Slept well last night. Denies AVH and does not appear to be RIS. Denies any current SI/HI/plan/intent.     Pt seen via telemedicine with synchronous voice and video telecommunication.       Current Medications:   Scheduled Meds:    clonazePAM  0.5 mg Oral TID    divalproex ER  1,000 mg Oral Daily    OLANZapine zydis  10 mg Oral QHS    tamsulosin  0.4 mg Oral QHS      PRN Meds: acetaminophen, aluminum-magnesium hydroxide-simethicone, hydrOXYzine pamoate, ibuprofen, LORazepam, OLANZapine zydis     Allergies:   Review of patient's allergies indicates:  No Known Allergies     OBJECTIVE:   Vitals   Vitals:    24 0612   BP: (!) 122/92   Pulse: 79   Resp: 20   Temp: 97.2 °F (36.2 °C)        Mental Status Exam:  Appearance: appropriate and fair hygiene/grooming  Sensorium: alert  Orientation: oriented to person, place, and time  Behavior/Cooperation: restless  Movements/Motor Activity: No tremor or involuntary movements observed. No psychomotor retardation or " "agitation  Speech: loud, pressured  Affect: irritable and labile  Mood: anxious, irritable, labile  Thought Process:  tangential at times  Associations: loose  Thought Content: denies SI/HI/plan/intent and +ongoing evidence of paranoia  Thought Perceptions: denies AVH and no RIS observed during assessment  Attention/Concentration: Impaired to some degree  Memory: recent and remote grossly intact  Insight: limited  Judgment: limited    Recent Results (from the past 48 hour(s))   Lipid panel    Collection Time: 04/04/24  4:40 AM   Result Value Ref Range    Cholesterol Total 144 <=200 mg/dL    HDL Cholesterol 55 35 - 60 mg/dL    Triglyceride 149 (H) 34 - 140 mg/dL    Cholesterol/HDL Ratio 3 0 - 5    Very Low Density Lipoprotein 30     LDL Cholesterol 59.00 50.00 - 140.00 mg/dL   SYPHILIS ANTIBODY (WITH REFLEX RPR)    Collection Time: 04/04/24  4:40 AM   Result Value Ref Range    Syphilis Antibody Nonreactive Nonreactive, Equivocal   Glucose, Fasting    Collection Time: 04/04/24  4:40 AM   Result Value Ref Range    Glucose Fasting 111 (H) 70 - 100 mg/dL      No results found for: "PHENYTOIN", "PHENOBARB", "VALPROATE", "CBMZ"      ASSESSMENT/PLAN:   Diagnosis:  Active Hospital Problems    Diagnosis  POA    *Bipolar affective disorder, current episode manic with psychotic symptoms [F31.2]  Yes    Cocaine abuse [F14.10]  Yes       Plan:  - Will add Zyprexa 5mg PO qAM as pt continues to appear elevated, irritable, impulsive (continue Zyprexa 10mg PO qhs)  - Cont Depakote ER 1000mg PO qhs for mood stabilization, will check VPA level on Monday 4/8 and likely titrate dose if subtherapeutic level  - Cut back on Klonopin to 0.5mg PO BID for today, with plans to continue to taper down as tolerated  - CTM and provide support    Expected Disposition Plan: Home      Alexsander Munoz MD  Psychiatry - [facility]  04/05/2024 9:55 AM    "

## 2024-04-05 NOTE — NURSING
"Daily Nursing Note:      Behavior:    Patient (Jayson Murcia is a 59 y.o. male, : 1965, MRN: [id number]) demonstrating an affect that was anxious, irritable, and agitated. Jayson demonstrating mood that is angry, anxious, and swings. Jayson had an appearance that was disheveled. Jayson denies suicidal ideation. Jayson denies suicide plan. Jayson denies homicidal ideation. Jayson denies hallucinations. He is labile and constricted with loud, pressured, profane speech.     Jayson's  height is 5' 9" (1.753 m) and weight is 75.1 kg (165 lb 9.1 oz). His temperature is 97.2 °F (36.2 °C). His blood pressure is 122/92 (abnormal) and his pulse is 79. His respiration is 20 and oxygen saturation is 97%.     Amberlys last BM was noted on: 24      Intervention:    Encourage Jayson to perform self-hygiene, grooming, and changing of clothing. Monitor Jayson's behavior and program compliance. Monitor Jayson for suicidal ideation, homicidal ideation, sleep disturbance, and hallucinations. Encourage Jayson to eat all portions of meals and assess for meal preferences. Monitor Jayson for intake and output to ensure hydration. Notify the Physician for any medication refusal and any change in patient condition.      Response:    Jayson verbalizes understand of unit process and procedures. He is compliant with medications, no adverse effects observed or reported.      Plan:     Continue to monitor per MD orders; maintain patient safety. Q 15 min safety checks with assault precautions.  "

## 2024-04-05 NOTE — NURSING
"Daily Nursing Note:      Behavior:    Patient (Jayson Murcia is a 59 y.o. male, : 1965, MRN: [id number]) demonstrating an affect that was flat. Jayson demonstrating mood that is anxious. Jayson had an appearance that was disheveled. Jayson denies suicidal ideation. Jayson denies suicide plan. Jayson denies homicidal ideation. Jayson denies hallucinations.    Jayson's  height is 5' 9" (1.753 m) and weight is 75.1 kg (165 lb 9.1 oz). His temperature is 97.7 °F (36.5 °C). His blood pressure is 115/87 and his pulse is 86. His respiration is 16 and oxygen saturation is 98%.     Amberlys last BM was noted on: _24______      Intervention:    Encourage Jayson to perform self-hygiene, grooming, and changing of clothing. Monitor Jayson's behavior and program compliance. Monitor Jayson for suicidal ideation, homicidal ideation, sleep disturbance, and hallucinations. Encourage Jayson to eat all portions of meals and assess for meal preferences. Monitor Jasyon for intake and output to ensure hydration. Notify the Physician/Physician Assistant/Advance Practice Registered Nurse (MD/PA/APRN) for any medication refusal and any change in patient condition.      Response:    Jayson verbalizes understand of unit process and procedures. Jayson is compliant with meds.      Plan:     Continue to monitor per MD/PA/APRN orders; maintain patient safety.  "

## 2024-04-05 NOTE — NURSING
"Pt walked into dayroom and hit his hand  on the table punching his fist in his hand, stating, "I need to get the fuck out of here, I don't know who put me here, but I've got to go!". His demeanor and speech are aggressive and threatening. Pt then went back to his room. Security spoke with him in his room, pt is calmer with no further aggression. Dr. Munoz notified.  "

## 2024-04-05 NOTE — PROGRESS NOTES
Recreation Therapy Progress Note    Date: 4 5 2024    Time: 1400    Group Title: leisure skills    Mood: nursing  excused pt to sleep. Pt was very irritable and aggressive. Pt received medicine to rest and calm down.     Behavior: pt excused     Affect: pt sleeping    Speech: pt sleeping    Cognition: pt sleeping    Participation Level: 0%     Intervention:cont rt services once pt is rested.           Recreation Therapist   Signature: lubna kidd MA CTRS

## 2024-04-06 PROCEDURE — 25000003 PHARM REV CODE 250: Performed by: PSYCHIATRY & NEUROLOGY

## 2024-04-06 PROCEDURE — 11400000 HC PSYCH PRIVATE ROOM

## 2024-04-06 RX ORDER — DIVALPROEX SODIUM 500 MG/1
1500 TABLET, FILM COATED, EXTENDED RELEASE ORAL DAILY
Status: DISCONTINUED | OUTPATIENT
Start: 2024-04-07 | End: 2024-04-07

## 2024-04-06 RX ADMIN — OLANZAPINE 5 MG: 5 TABLET, ORALLY DISINTEGRATING ORAL at 08:04

## 2024-04-06 RX ADMIN — DIVALPROEX SODIUM 1000 MG: 500 TABLET, FILM COATED, EXTENDED RELEASE ORAL at 08:04

## 2024-04-06 RX ADMIN — OLANZAPINE 10 MG: 10 TABLET, ORALLY DISINTEGRATING ORAL at 08:04

## 2024-04-06 RX ADMIN — LORAZEPAM 1 MG: 1 TABLET ORAL at 12:04

## 2024-04-06 RX ADMIN — CLONAZEPAM 0.5 MG: 0.5 TABLET ORAL at 08:04

## 2024-04-06 RX ADMIN — TAMSULOSIN HYDROCHLORIDE 0.4 MG: 0.4 CAPSULE ORAL at 08:04

## 2024-04-06 NOTE — PROGRESS NOTES
Hospital day 3.     Patient was today seen today in telemedicine format.  Patient was interviewed via tele medicine platform loosened video synchronized.  Patient was in [facility].  Provider was in [facility].    Staff report that over the past 4 hours he did have episode which he became agitated irritable and required p.r.n. medications.  He was subsequently resolved without issue.      Today he does remain downcast remains irritable edge about assertive backing off from clonazepam has been useful at this time.  Certainly he does not sedate but irritability remains exquisitely high.      Patient was hypervigilant untrusting degree intensity he was pressured speech was lessened difficulties in which patient was mood activated elevated certainly consideration for possible upward titration of Depakote will be considered.      On mental status examination 59 white male whose affect at this time he was range.  Whose speech was good articulation and execution.  His thought processes this time mildly loosened.  His thought content demonstrated no clear evidence of visual hallucinations active statements auditory hallucinations.  He continues to be hypervigilant and paranoid.  Thoughts continued to be disorganized but certainly not to degree that has been previously.  His insight and judgment deemed to be poor.  Orientation was intact     Impression:  Bipolar affective disorder most recent episode manic with psychotic features     Estimated continued stay greater than 72 hours     Indications: Patient was actively impaired judgment responding to marked agitation irritability and could not safely be maintained at care a lower level.    Recommendations:   1. We will advance doses of Depakote ER 1500 p.o. q.h.s.   2. Doses of Zyprexa just been increased to 15 mg p.o. q.day and we will monitor  3.  We will consider possible downward titration of olanzapine patient was shows sedation but no one titration for  mood this time risk of patient was becoming activated once again.

## 2024-04-06 NOTE — PLAN OF CARE
Problem: Violence Risk or Actual  Goal: Anger and Impulse Control  Intervention: Minimize Safety Risk  Flowsheets (Taken 4/5/2024 2328)  Behavior Management:   impulse control promoted   boundaries reinforced  Intervention: Promote Self-Control  Flowsheets (Taken 4/5/2024 2328)  Supportive Measures:   active listening utilized   decision-making supported   positive reinforcement provided   self-responsibility promoted   self-care encouraged   verbalization of feelings encouraged   problem-solving facilitated   self-reflection promoted  Environmental Support: calm environment promoted     Problem: Adult Behavioral Health Plan of Care  Goal: Adheres to Safety Considerations for Self and Others  Intervention: Develop and Maintain Individualized Safety Plan  Flowsheets (Taken 4/5/2024 2328)  Safety Measures:   suicide assessment completed   safety rounds completed  Goal: Absence of New-Onset Illness or Injury  Intervention: Identify and Manage Fall Risk  Flowsheets (Taken 4/5/2024 2328)  Safety Measures:   suicide assessment completed   safety rounds completed  Intervention: Prevent Infection  Flowsheets (Taken 4/5/2024 2328)  Infection Prevention: hand hygiene promoted  Goal: Optimized Coping Skills in Response to Life Stressors  Intervention: Promote Effective Coping Strategies  Flowsheets (Taken 4/5/2024 2328)  Supportive Measures:   active listening utilized   decision-making supported   positive reinforcement provided   self-responsibility promoted   self-care encouraged   verbalization of feelings encouraged   problem-solving facilitated   self-reflection promoted
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
## 2024-04-06 NOTE — GROUP NOTE
Nursing Group      Group Focus: Communication Skills      Number of patients in attendance: 1    Group Start Time: 1200  Group End Time:  1245  Groups Date: 4/6/2024  Group Topic:  Behavioral Health  Group Department: [facility]  Group Facilitators:  Keli Victor LPN  _____________________________________________________________________    Patient Name: Jayson Murcia  MRN: [id number]  Patient Class: IP- Psych   Admission Date\Time: 4/3/2024  1:15 AM  Hospital Length of Stay: 3  Primary Care Provider: Casi Delgado MD     Referred by: Behavioral Medicine Unit Treatment Team     Target symptoms: Depression, Anxiety, and Mood Disorder     Patient's response to treatment: Not Participating     Progress toward goals: Not progressing     Interval History: Pt did not attend     Diagnosis:      Plan: Continue treatment on BMU

## 2024-04-06 NOTE — NURSING
"Daily Nursing Note:      Behavior:    Patient (Jayson Murcia is a 59 y.o. male, : 1965, MRN: [id number]) demonstrating an affect that was anxious, irritable, and agitated. Jayson demonstrating mood that is angry and anxious. He was lying in bed upon skilled nurse entrance, with eyes closed, asleep.  Easily aroused by voice.  Answers to assessment questions were short and dry.  A bit irritable at onset of the assessment and seemed to mellow out a bit as we continued the assessment.   Jayson had an appearance that was clean. Jayson denies suicidal ideation. Jayson denies suicide plan. Jayson denies homicidal ideation. Jayson denies hallucinations.    Jayson's  height is 5' 9" (1.753 m) and weight is 75.1 kg (165 lb 9.1 oz). His temperature is 97.5 °F (36.4 °C). His blood pressure is 123/90 (abnormal) and his pulse is 77. His respiration is 16 and oxygen saturation is 98%.   Amberlys last BM was noted on  2024.    Intervention:    Encouraged Jayson to perform self-hygiene, grooming, and changing of clothing. Monitored Jayson's behavior and program compliance. Monitored Jayson for suicidal ideation, homicidal ideation, sleep disturbance, and hallucinations. Encouraged Jayson to eat all portions of meals and assesse for meal preferences. Monitored Jayson for intake and output to ensure hydration. Will notify the Physician for any medication refusal and any change in patient condition.      Response:    Jayson verbalizes "uh-huh" in understanding of unit process and procedures and for the rest of this evening's schedule.  Jayson reported no medication issues.      Plan:     Continue to monitor per MD orders; maintain patient safety with safety checks Q15 minutes and prn.   "

## 2024-04-07 PROCEDURE — 25000003 PHARM REV CODE 250: Performed by: PSYCHIATRY & NEUROLOGY

## 2024-04-07 PROCEDURE — 11400000 HC PSYCH PRIVATE ROOM

## 2024-04-07 RX ORDER — CLONAZEPAM 0.5 MG/1
0.5 TABLET ORAL NIGHTLY
Status: DISCONTINUED | OUTPATIENT
Start: 2024-04-07 | End: 2024-04-10

## 2024-04-07 RX ADMIN — DIVALPROEX SODIUM 1500 MG: 500 TABLET, FILM COATED, EXTENDED RELEASE ORAL at 08:04

## 2024-04-07 RX ADMIN — OLANZAPINE 5 MG: 5 TABLET, ORALLY DISINTEGRATING ORAL at 08:04

## 2024-04-07 RX ADMIN — CLONAZEPAM 0.5 MG: 0.5 TABLET ORAL at 08:04

## 2024-04-07 RX ADMIN — TAMSULOSIN HYDROCHLORIDE 0.4 MG: 0.4 CAPSULE ORAL at 08:04

## 2024-04-07 RX ADMIN — OLANZAPINE 15 MG: 10 TABLET, ORALLY DISINTEGRATING ORAL at 08:04

## 2024-04-07 RX ADMIN — LORAZEPAM 1 MG: 1 TABLET ORAL at 01:04

## 2024-04-07 RX ADMIN — LORAZEPAM 1 MG: 1 TABLET ORAL at 08:04

## 2024-04-07 NOTE — NURSING
"Daily Nursing Note:      Behavior:    Patient (Jayson Murcia is a 59 y.o. male, : 1965, MRN: [id number]) demonstrating an affect that was anxious, irritable, agitated, and angry. Jayson demonstrating mood that is angry and anxious. Jayson had an appearance that was clean. Jayson denies suicidal ideation. Jayson denies suicide plan. Jayson denies homicidal ideation. Jayson denies hallucinations. He is cooperative but angry.  States he has been here long enough and is ready to go.  Also states he should never have been brought here.      Jayson's  height is 5' 9" (1.753 m) and weight is 75.1 kg (165 lb 9.1 oz). His temperature is 98 °F (36.7 °C). His blood pressure is 144/98 (abnormal) and his pulse is 81. His respiration is 18 and oxygen saturation is 96%. Jayson's last BM was noted on 24.    Intervention:    Encouraged Jayson to perform self-hygiene, grooming, and changing of clothing. Monitored Jayson's behavior and program compliance. Monitored Jayson for suicidal ideation, homicidal ideation, sleep disturbance, and hallucinations. Encouraged Jayson to eat all portions of meals and assesse for meal preferences. Monitored Jayson for intake and output to ensure hydration. Will notify the Physician for any medication refusal and any change in patient condition.      Response:    Jayson verbalizes understandinging of unit process and procedures then states "I'm not staying here much longer."  Jayson has been compliant with medication regimen.        Plan:     Continue to monitor per MD orders; maintain patient safety with safety checks Q15 minutes and prn.   "

## 2024-04-07 NOTE — PLAN OF CARE
Problem: Violence Risk or Actual  Goal: Anger and Impulse Control  Outcome: Ongoing, Progressing  Intervention: Promote Self-Control  Flowsheets (Taken 4/6/2024 2258)  Supportive Measures:   active listening utilized   decision-making supported   positive reinforcement provided   self-care encouraged   self-responsibility promoted  Environmental Support: calm environment promoted     Problem: Adult Behavioral Health Plan of Care  Goal: Plan of Care Review  Outcome: Ongoing, Progressing  Goal: Patient-Specific Goal (Individualization)  Outcome: Ongoing, Progressing  Goal: Adheres to Safety Considerations for Self and Others  Outcome: Ongoing, Progressing  Intervention: Develop and Maintain Individualized Safety Plan  Flowsheets (Taken 4/6/2024 2258)  Safety Measures: suicide assessment completed  Goal: Absence of New-Onset Illness or Injury  Outcome: Ongoing, Progressing  Goal: Optimized Coping Skills in Response to Life Stressors  Outcome: Ongoing, Progressing  Intervention: Promote Effective Coping Strategies  Flowsheets (Taken 4/6/2024 2258)  Supportive Measures:   active listening utilized   decision-making supported   positive reinforcement provided   self-care encouraged   self-responsibility promoted  Goal: Develops/Participates in Therapeutic [place] to Support Successful Transition  Outcome: Ongoing, Progressing  Goal: Rounds/Family Conference  Outcome: Ongoing, Progressing     Problem: Activity and Energy Impairment (Excessive Substance Use)  Goal: Optimized Energy Level (Excessive Substance Use)  Outcome: Ongoing, Progressing  Intervention: Optimize Energy Level  Flowsheets (Taken 4/6/2024 2258)  Activity (Behavioral Health): up ad sonny     Problem: Behavior Regulation Impairment (Excessive Substance Use)  Goal: Improved Behavioral Control (Excessive Substance Use)  Outcome: Ongoing, Progressing  Intervention: Promote Behavior and Impulse Control  Flowsheets (Taken 4/6/2024 2258)  Behavior Management:    impulse control promoted   boundaries reinforced     Problem: Decreased Participation and Engagement (Excessive Substance Use)  Goal: Increased Participation and Engagement (Excessive Substance Use)  Outcome: Ongoing, Progressing  Intervention: Facilitate Participation and Engagement  Flowsheets (Taken 4/6/2024 2258)  Supportive Measures:   active listening utilized   decision-making supported   positive reinforcement provided   self-care encouraged   self-responsibility promoted     Problem: Physiologic Impairment (Excessive Substance Use)  Goal: Improved Physiologic Symptoms (Excessive Substance Use)  Outcome: Ongoing, Progressing     Problem: Social, Occupational or Functional Impairment (Excessive Substance Use)  Goal: Enhanced Social, Occupational or Functional Skills (Excessive Substance Use)  Outcome: Ongoing, Progressing  Intervention: Promote Social, Occupational and Functional Ability  Flowsheets (Taken 4/6/2024 2258)  Trust Relationship/Rapport:   care explained   emotional support provided   empathic listening provided   questions encouraged   questions answered   thoughts/feelings acknowledged   reassurance provided

## 2024-04-07 NOTE — PROGRESS NOTES
LACEY # 4    59-year-old white male who at this time presented to this facility with ongoing difficulties of lability affect and impulsivity.  He continues to show many with the behaviors over the past 24 hours.  He was escalated was quite agitated irritable at times required much redirection.  Apparently whose up very early last evening some have a disruption in the unit as he was agitated irritable.    He has been accepting medications but does show evidence which he was becoming a little bit more resistant to absolute compliance.      On mental status examination:  59-year-old white male whose speech is somewhat slow today.  He was very soft in terms of its production.  His thought content demonstrated no clear evidence of any visual hallucinations.  There continued to be preponderance of paranoia and suspiciousness of others.  There continued to be so evidence of which he remains paranoid towards others at the apartment complex.  He had no clear evidence of any visual hallucinations.  There was no clear evidence of any auditory hallucinations this time.  Predominant symptoms appear to be that of paranoia and grandiosity.  His insight and judgment deemed to be poor.  He was still has a very much inflated sense of self.    Impression :   bipolar affective disorder most recent episode manic with psychotic features  Cocaine use disorder  Nonadherence to medications     Estimated continued stay 96 hours or longer     Indications:  Patient was although a little bit more settled continues show persistent impairment judgment, distortions perceptions such at this time could not safely be maintained at a lower service level without an order risk for further decompensation     Recommendations:  1. We will increase doses of Depakote to 1250 mg p.o. q.h.s.  2. We will reduce doses of clonazepam to 0.5 mg p.o. q.h.s.   3. Have increase doses of Zyprexa to 15 mg p.o. q.h.s. for maintaining Zyprexa 5 mg p.o. Q a.m. daily.    4.  We will re-evaluate and reassess with the next 24 hours.  Depakote level will be drawn tomorrow.  Prognosis at this time remains guarded.

## 2024-04-07 NOTE — PLAN OF CARE
Problem: Violence Risk or Actual  Goal: Anger and Impulse Control  Outcome: Ongoing, Not Progressing  Intervention: Minimize Safety Risk  Flowsheets (Taken 4/6/2024 1843)  Behavior Management:   boundaries reinforced   impulse control promoted  Sensory Stimulation Regulation: quiet environment promoted     Problem: Adult Behavioral Health Plan of Care  Goal: Plan of Care Review  Outcome: Ongoing, Progressing     Problem: Activity and Energy Impairment (Excessive Substance Use)  Goal: Optimized Energy Level (Excessive Substance Use)  Outcome: Ongoing, Progressing     Problem: Decreased Participation and Engagement (Excessive Substance Use)  Goal: Increased Participation and Engagement (Excessive Substance Use)  Outcome: Ongoing, Progressing  Intervention: Facilitate Participation and Engagement  Flowsheets (Taken 4/6/2024 1843)  Supportive Measures:   active listening utilized   verbalization of feelings encouraged     Problem: Physiologic Impairment (Excessive Substance Use)  Goal: Improved Physiologic Symptoms (Excessive Substance Use)  Outcome: Ongoing, Progressing     Problem: Social, Occupational or Functional Impairment (Excessive Substance Use)  Goal: Enhanced Social, Occupational or Functional Skills (Excessive Substance Use)  Outcome: Ongoing, Progressing  Intervention: Promote Social, Occupational and Functional Ability  Flowsheets (Taken 4/6/2024 1843)  Trust Relationship/Rapport:   emotional support provided   thoughts/feelings acknowledged  Social Functional Ability Promotion: social interaction promoted     Problem: Social, Occupational or Functional Impairment (Excessive Substance Use)  Goal: Enhanced Social, Occupational or Functional Skills (Excessive Substance Use)  Outcome: Ongoing, Progressing  Intervention: Promote Social, Occupational and Functional Ability  Flowsheets (Taken 4/6/2024 1843)  Trust Relationship/Rapport:   emotional support provided   thoughts/feelings acknowledged  Social  Functional Ability Promotion: social interaction promoted     Problem: Social, Occupational or Functional Impairment (Excessive Substance Use)  Goal: Enhanced Social, Occupational or Functional Skills (Excessive Substance Use)  Outcome: Ongoing, Progressing  Intervention: Promote Social, Occupational and Functional Ability  Flowsheets (Taken 4/6/2024 3737)  Trust Relationship/Rapport:   emotional support provided   thoughts/feelings acknowledged  Social Functional Ability Promotion: social interaction promoted

## 2024-04-07 NOTE — NURSING
"Daily Nursing Note:      Behavior:    Patient (Jayson Murcia is a 59 y.o. male, : 1965, MRN: [id number]) demonstrating an affect that was anxious, irritable, and angry. Jayson demonstrating mood that is anxious and swings. Jayson had an appearance that was clean. Jayson denies suicidal ideation. Jayson denies suicide plan. Jayson denies homicidal ideation. Jayson denies hallucinations. Irritable and verbally aggressive. "I'm not out to hurt ya'll, but when Wednesday comes you're gonna see".     Jayson's  height is 5' 9" (1.753 m) and weight is 75.7 kg (166 lb 14.2 oz). His temperature is 97.7 °F (36.5 °C). His blood pressure is 122/84 and his pulse is 93. His respiration is 18 and oxygen saturation is 97%.     Amberlys last BM was noted on: 24      Intervention:    Encourage Jayson to perform self-hygiene, grooming, and changing of clothing. Monitor Jayson's behavior and program compliance. Monitor Jayson for suicidal ideation, homicidal ideation, sleep disturbance, and hallucinations. Encourage Jayson to eat all portions of meals and assess for meal preferences. Monitor Jayson for intake and output to ensure hydration. Notify the Physician  for any medication refusal and any change in patient condition.      Response:    Jayson verbalizes understand of unit process and procedures. Jayson has been compliant with medications and was administered Ativan 1mg at 13:10 for increased anxiety and agitation.    Plan:     Continue to monitor per MD orders; maintain patient safety. Q 15 min safety checks with assault precautions.  "

## 2024-04-07 NOTE — NURSING
"Mr. Tyler woke up during this night rambling down the mccann, insisting that he be released.  Made multiple threats that if he didn't leave today, he was going to go to [facility], cause then he could "bond out of [facility], but I can't yoo out of here." Jayson was asked multiple times if he would like some meds to take the edge off.  He retorted, "I don't want y'all medicine, I don't want y'all shots. I wanna get the hell out of here."  He continued to pace in the mccann back and forth a few times, hitting his fist into his other hand, like he wants to fight.  Redirected back to his room. He kneeled and prayed in the hallway for a moment then went to bed.  Will continue to monitor Q15 minutes and prn.    "

## 2024-04-07 NOTE — PLAN OF CARE
Problem: Violence Risk or Actual  Goal: Anger and Impulse Control  Outcome: Ongoing, Not Progressing  Intervention: Minimize Safety Risk  Flowsheets (Taken 4/7/2024 1823)  Behavior Management:   boundaries reinforced   impulse control promoted  Intervention: Promote Self-Control  Flowsheets (Taken 4/7/2024 1823)  Supportive Measures:   active listening utilized   positive reinforcement provided   verbalization of feelings encouraged  Environmental Support: calm environment promoted     Problem: Adult Behavioral Health Plan of Care  Goal: Plan of Care Review  Outcome: Ongoing, Progressing  Goal: Optimized Coping Skills in Response to Life Stressors  Outcome: Ongoing, Not Progressing  Intervention: Promote Effective Coping Strategies  Flowsheets (Taken 4/7/2024 1823)  Supportive Measures:   active listening utilized   positive reinforcement provided   verbalization of feelings encouraged     Problem: Activity and Energy Impairment (Excessive Substance Use)  Goal: Optimized Energy Level (Excessive Substance Use)  Outcome: Ongoing, Not Progressing     Problem: Behavior Regulation Impairment (Excessive Substance Use)  Goal: Improved Behavioral Control (Excessive Substance Use)  Outcome: Ongoing, Not Progressing  Intervention: Promote Behavior and Impulse Control  Flowsheets (Taken 4/7/2024 1823)  Behavior Management:   boundaries reinforced   impulse control promoted     Problem: Decreased Participation and Engagement (Excessive Substance Use)  Goal: Increased Participation and Engagement (Excessive Substance Use)  Outcome: Ongoing, Not Progressing  Intervention: Facilitate Participation and Engagement  Flowsheets (Taken 4/7/2024 1823)  Supportive Measures:   active listening utilized   positive reinforcement provided   verbalization of feelings encouraged     Problem: Physiologic Impairment (Excessive Substance Use)  Goal: Improved Physiologic Symptoms (Excessive Substance Use)  Outcome: Ongoing, Not Progressing      Problem: Social, Occupational or Functional Impairment (Excessive Substance Use)  Goal: Enhanced Social, Occupational or Functional Skills (Excessive Substance Use)  Outcome: Ongoing, Not Progressing  Intervention: Promote Social, Occupational and Functional Ability  Flowsheets (Taken 4/7/2024 1823)  Trust Relationship/Rapport:   emotional support provided   empathic listening provided   thoughts/feelings acknowledged  Social Functional Ability Promotion: social interaction promoted     Problem: Fall Injury Risk  Goal: Absence of Fall and Fall-Related Injury  Outcome: Ongoing, Progressing  Intervention: Promote Injury-Free Environment  Flowsheets (Taken 4/7/2024 1823)  Safety Promotion/Fall Prevention:   nonskid shoes/socks when out of bed   medications reviewed

## 2024-04-08 LAB — VALPROATE SERPL-MCNC: 53.4 UG/ML (ref 50–100)

## 2024-04-08 PROCEDURE — 11400000 HC PSYCH PRIVATE ROOM

## 2024-04-08 PROCEDURE — 25000003 PHARM REV CODE 250: Performed by: PSYCHIATRY & NEUROLOGY

## 2024-04-08 PROCEDURE — 80164 ASSAY DIPROPYLACETIC ACD TOT: CPT | Performed by: PSYCHIATRY & NEUROLOGY

## 2024-04-08 RX ORDER — LORAZEPAM 1 MG/1
1 TABLET ORAL 4 TIMES DAILY PRN
Status: DISCONTINUED | OUTPATIENT
Start: 2024-04-08 | End: 2024-04-11 | Stop reason: HOSPADM

## 2024-04-08 RX ORDER — HYDROXYZINE PAMOATE 25 MG/1
50 CAPSULE ORAL EVERY 6 HOURS PRN
Status: DISCONTINUED | OUTPATIENT
Start: 2024-04-08 | End: 2024-04-11 | Stop reason: HOSPADM

## 2024-04-08 RX ADMIN — TAMSULOSIN HYDROCHLORIDE 0.4 MG: 0.4 CAPSULE ORAL at 08:04

## 2024-04-08 RX ADMIN — OLANZAPINE 5 MG: 5 TABLET, ORALLY DISINTEGRATING ORAL at 08:04

## 2024-04-08 RX ADMIN — OLANZAPINE 15 MG: 10 TABLET, ORALLY DISINTEGRATING ORAL at 08:04

## 2024-04-08 RX ADMIN — CLONAZEPAM 0.5 MG: 0.5 TABLET ORAL at 08:04

## 2024-04-08 RX ADMIN — DIVALPROEX SODIUM 1250 MG: 500 TABLET, FILM COATED, EXTENDED RELEASE ORAL at 08:04

## 2024-04-08 NOTE — GROUP NOTE
Education Group      Group Focus: Offered group to discharge planning.       Number of patients in attendance: 5    Group Start Time: 1215  Group End Time:  1300  Groups Date: 4/8/2024  Group Topic:  Behavioral Health  Group Department: [facility]  Group Facilitators:  Jazmine Nayak LPN  _____________________________________________________________________    Patient Name: Jayson Murcia  MRN: [id number]  Patient Class: IP- Psych   Admission Date\Time: 4/3/2024  1:15 AM  Hospital Length of Stay: 5  Primary Care Provider: Casi Delgado MD     Referred by: Behavioral Medicine Unit Treatment Team     Target symptoms:      Patient's response to treatment: did not attend     Progress toward goals:      Interval History:      Diagnosis:      Plan: Continue treatment on BMU

## 2024-04-08 NOTE — NURSING
"Daily Nursing Note:      Behavior:    Patient (Jayson Murcia is a 59 y.o. male, : 1965, MRN: [id number]) demonstrating an affect that was flat. Jayson demonstrating mood that is depressed, withdrawn, quiet and anxious. Jayson had an appearance that was clean. Jayson denies suicidal ideation. Jayson denies suicide plan. Jayson denies homicidal ideation. Jayson denies hallucinations.    Jayson's  height is 5' 9" (1.753 m) and weight is 75.7 kg (166 lb 14.2 oz). His temperature is 98 °F (36.7 °C). His blood pressure is 124/93 (abnormal) and his pulse is 86. His respiration is 18 and oxygen saturation is 97%.     Amberlys last BM was noted on:2024.    Intervention:    Encourage Jayson to perform self-hygiene, grooming, and changing of clothing. Monitor Jayson's behavior and program compliance. Monitor Jayson for suicidal ideation, homicidal ideation, sleep disturbance, and hallucinations. Encourage Jayson to eat all portions of meals and assess for meal preferences. Monitor Jayson for intake and output to ensure hydration. Notify the Physician for any medication refusal and any change in patient condition.      Response:    Jayson verbalizes understand of unit process and procedures. Jayson has been compliant with medications and reports no adverse effects.    Plan:     Continue to monitor per MD orders; maintain patient safety.Q15min safety checks. Assault precautions.  "

## 2024-04-08 NOTE — NURSING
"Daily Nursing Note:      Behavior:    Patient (Jayson Murcia is a 59 y.o. male, : 1965, MRN: [id number]) demonstrating an affect that was anxious and  labile. Jayson demonstrating mood that is depressed and anxious. Jayson had an appearance that was clean. Jayson denies suicidal ideation. Jayson denies suicide plan. Jayson denies homicidal ideation. Jayson denies hallucinations.    Jayson's  height is 5' 9" (1.753 m) and weight is 75.7 kg (166 lb 14.2 oz). His temperature is 98.1 °F (36.7 °C). His blood pressure is 122/84 and his pulse is 93. His respiration is 18 and oxygen saturation is 97%. Jayson's last BM was noted on: 2024.      Intervention:    Encouraged Jayson to perform self-hygiene, grooming, and changing of clothing. Monitored Jayson's behavior and program compliance. Monitored Jayson for suicidal ideation, homicidal ideation, sleep disturbance, and hallucinations. Encouraged Jayson to eat all portions of meals and assesse for meal preferences. Monitored Jayson for intake and output to ensure hydration. Will notify the Physician for any medication refusal and any change in patient condition.      Response:    Jayson  understands unit process and procedures. Jayson reported medications regimen compliance.        Plan:     Continue to monitor per MD orders; maintain patient safety with safety checks Q15 minutes and prn.   "

## 2024-04-08 NOTE — PROGRESS NOTES
Recreation Therapy Progress Note    Date:  04/08/2024    Time:  1400    Group Title:  Leisure skills    Mood:  Anxious focused on discharge    Behavior:  Motivation and prompts needed to attend RT group to enhance relaxation and social skills    Affect:  Patient anxious and focused on discharge but willing to come to group with staff motivation and prompts    Speech:  Patient started talking low once he was able to relax the music activity.    Cognition:  Alert in group    Participation Level:  Patient did 100% in group with music and dance activity with RT staff and peers.    Intervention:  Continue RT services          Recreation Therapist   Signature:  Anna Cavlillo MA CTRS

## 2024-04-08 NOTE — GROUP NOTE
Group Psychotherapy       Group Focus: Promoting Healthy Lifestyles      Number of patients in attendance: 6    Group on the Importance of Sleep and how to get healthy sleep.      -How sleep affects your health    -Benefits of sleep    -Handout Given    Group Start Time: 0800  Group End Time:  0840  Groups Date: 4/7/2024  Group Topic:  Behavioral Health  Group Department: [facility]  Group Facilitators:  Maricel Flower RN; [facility]  _____________________________________________________________________    Patient Name: Jayson Murcia  MRN: [id number]  Patient Class: IP- Psych   Admission Date\Time: 4/3/2024  1:15 AM  Hospital Length of Stay: 4  Primary Care Provider: Casi Delgado MD     Referred by: Behavioral Medicine Unit Treatment Team     Target symptoms: Poor Coping Skills     Patient's response to treatment: Handout given     Progress toward goals: Progressing slowly     Interval History: calm and cooperative     Diagnosis: Bipolar affective disorder      Plan: Continue treatment on BMU

## 2024-04-08 NOTE — PROGRESS NOTES
JAGRUTI Olivier# 5    59-year-old white male whose affect at this time is a little bit less labile.  He was more alert today since making reductions in doses of clonazepam.  He reports he slept well last night and staff report he was had no major upsets or agitation.      He has been compliant with medications.  His Depakote level was in the mid 50s.  He describes no difficulties with lightheadedness or dizziness he was tolerated high doses of Depakote now titrated upwards to 1250 mg p.o. q.day.      On mental status examination:  59-year-old white male whose affect at this time was inappropriate labile.  His speech was with fairly good articulation.  His thought processes this time were mildly loose but certainly markedly improved over his admission.  His thought content demonstrated no clear evidence of any visual hallucinations.  He continues to have some evidence of delusions but they appeared to be little bit less prominent and we will bit better encapsulated particularly paranoia.  His insight and judgment deemed to be limited poor.      Impression:  Bipolar affective disorder most recent episode manic with psychotic features  Stimulant use disorder  Cannabis use disorder     Indications:  Patient was time presents ongoing challenges in terms of mood failure to comply with medications outpatient basis such remains suspicious labile at this time has not achieved adequate stabilization with current medication trials     Estimated continued hospitalization 72-96 hours     Recommendations:  1. Continue trials of Klonopin reducing those doses 0.5 mg p.o. q.h.s.   2. Continue trials of olanzapine and split dosing of 5 mg in the morning and 15 mg at hours sleep.  We will monitor for any sedation.  Patient does appear to be showing some improvement in terms of mood as well as improvement overall reality testing   3. Continue trials of Depakote ER 1250 mg p.o. q.h.s. most recent Depakote level was roughly 56.  Certainly he was  having no adverse events with the medication so we will maintain at this time.

## 2024-04-08 NOTE — PLAN OF CARE
Problem: Violence Risk or Actual  Goal: Anger and Impulse Control  Outcome: Ongoing, Progressing  Intervention: Minimize Safety Risk  Flowsheets (Taken 4/8/2024 1223)  Sensory Stimulation Regulation: quiet environment promoted  Intervention: Promote Self-Control  Flowsheets (Taken 4/8/2024 1223)  Environmental Support: calm environment promoted     Problem: Adult Behavioral Health Plan of Care  Goal: Plan of Care Review  Outcome: Ongoing, Progressing  Goal: Patient-Specific Goal (Individualization)  Outcome: Ongoing, Progressing  Goal: Adheres to Safety Considerations for Self and Others  Outcome: Ongoing, Progressing  Goal: Absence of New-Onset Illness or Injury  Outcome: Ongoing, Progressing  Intervention: Prevent Infection  Flowsheets (Taken 4/8/2024 1223)  Infection Prevention:   hand hygiene promoted   rest/sleep promoted  Goal: Optimized Coping Skills in Response to Life Stressors  Outcome: Ongoing, Progressing  Goal: Develops/Participates in Therapeutic [place] to Support Successful Transition  Outcome: Ongoing, Progressing  Intervention: Mutually Develop Transition Plan  Flowsheets (Taken 4/8/2024 1223)  Current Discharge Risk:   psychiatric illness   substance use/abuse  Outpatient/Agency/Support Group Needs:   outpatient counseling   outpatient medication management   outpatient psychiatric care (specify)   outpatient substance abuse treatment (specify)  Transition Support: follow-up care discussed  Anticipated Discharge Disposition: home or self-care  Patient/Family Anticipated Services at Transition:   mental health services   outpatient care   rehabilitation services  Patient/Family Anticipates Transition to: home  Concerns to be Addressed: denies needs/concerns at this time  Goal: Rounds/Family Conference  Outcome: Ongoing, Progressing     Problem: Activity and Energy Impairment (Excessive Substance Use)  Goal: Optimized Energy Level (Excessive Substance Use)  Outcome: Ongoing,  Progressing  Intervention: Optimize Energy Level  Flowsheets (Taken 4/8/2024 1223)  Diversional Activity:   [employer]   television     Problem: Behavior Regulation Impairment (Excessive Substance Use)  Goal: Improved Behavioral Control (Excessive Substance Use)  Outcome: Ongoing, Progressing     Problem: Decreased Participation and Engagement (Excessive Substance Use)  Goal: Increased Participation and Engagement (Excessive Substance Use)  Outcome: Ongoing, Progressing  Intervention: Facilitate Participation and Engagement  Flowsheets (Taken 4/8/2024 1223)  Diversional Activity:   art work   music   puzzles   television     Problem: Physiologic Impairment (Excessive Substance Use)  Goal: Improved Physiologic Symptoms (Excessive Substance Use)  Outcome: Ongoing, Progressing     Problem: Social, Occupational or Functional Impairment (Excessive Substance Use)  Goal: Enhanced Social, Occupational or Functional Skills (Excessive Substance Use)  Outcome: Ongoing, Progressing     Problem: Fall Injury Risk  Goal: Absence of Fall and Fall-Related Injury  Outcome: Ongoing, Progressing  Intervention: Identify and Manage Contributors  Flowsheets (Taken 4/8/2024 1223)  Self-Care Promotion: independence encouraged

## 2024-04-08 NOTE — PLAN OF CARE
Problem: Violence Risk or Actual  Goal: Anger and Impulse Control  Outcome: Ongoing, Progressing  Intervention: Promote Self-Control  Flowsheets (Taken 4/8/2024 0546)  Supportive Measures:   active listening utilized   decision-making supported   positive reinforcement provided   relaxation techniques promoted   self-responsibility promoted  Environmental Support:   calm environment promoted   distractions minimized     Problem: Adult Behavioral Health Plan of Care  Goal: Plan of Care Review  Outcome: Ongoing, Progressing  Goal: Patient-Specific Goal (Individualization)  Outcome: Ongoing, Progressing  Goal: Adheres to Safety Considerations for Self and Others  Outcome: Ongoing, Progressing  Intervention: Develop and Maintain Individualized Safety Plan  Flowsheets (Taken 4/7/2024 2015)  Safety Measures: suicide assessment completed  Goal: Absence of New-Onset Illness or Injury  Outcome: Ongoing, Progressing  Intervention: Prevent Infection  Flowsheets (Taken 4/7/2024 2015)  Infection Prevention: hand hygiene promoted  Goal: Optimized Coping Skills in Response to Life Stressors  Outcome: Ongoing, Progressing  Intervention: Promote Effective Coping Strategies  Flowsheets (Taken 4/7/2024 2015)  Supportive Measures:   active listening utilized   decision-making supported   positive reinforcement provided   relaxation techniques promoted   self-responsibility promoted  Goal: Develops/Participates in Therapeutic [place] to Support Successful Transition  Outcome: Ongoing, Progressing  Goal: Rounds/Family Conference  Outcome: Ongoing, Progressing     Problem: Activity and Energy Impairment (Excessive Substance Use)  Goal: Optimized Energy Level (Excessive Substance Use)  Outcome: Ongoing, Progressing     Problem: Behavior Regulation Impairment (Excessive Substance Use)  Goal: Improved Behavioral Control (Excessive Substance Use)  Outcome: Ongoing, Progressing     Problem: Decreased Participation and Engagement (Excessive  Substance Use)  Goal: Increased Participation and Engagement (Excessive Substance Use)  Outcome: Ongoing, Progressing     Problem: Physiologic Impairment (Excessive Substance Use)  Goal: Improved Physiologic Symptoms (Excessive Substance Use)  Outcome: Ongoing, Progressing     Problem: Social, Occupational or Functional Impairment (Excessive Substance Use)  Goal: Enhanced Social, Occupational or Functional Skills (Excessive Substance Use)  Outcome: Ongoing, Progressing     Problem: Fall Injury Risk  Goal: Absence of Fall and Fall-Related Injury  Outcome: Ongoing, Progressing

## 2024-04-08 NOTE — PROGRESS NOTES
Recreation Therapy Progress Note    Date:  04/08/2024    Time:  10:00 a.m.    Group Title:  Creative expression    Mood:  Patient anxious in withdrawn because he wants to be discharged so patient refused to come to group    Behavior:  Patient isolated in room because he stated he wants to go home and he has not coming to group    Affect:  Anxious guarded and isolated in room    Speech:  Normal    Cognition:  Alert but distracted thinking    Participation Level:  0% patient refused group x2    Intervention:  Continue to try to motivate patient to attend group and participate at minimum to moderate level.  Note patient does enjoys recreational therapy group just upset about not being discharge.        Recreation Therapist   Signature:  Anna Calvillo ma ctrs

## 2024-04-09 PROCEDURE — 25000003 PHARM REV CODE 250: Performed by: PSYCHIATRY & NEUROLOGY

## 2024-04-09 PROCEDURE — 11400000 HC PSYCH PRIVATE ROOM

## 2024-04-09 RX ORDER — OLANZAPINE 10 MG/1
20 TABLET, ORALLY DISINTEGRATING ORAL NIGHTLY
Status: DISCONTINUED | OUTPATIENT
Start: 2024-04-09 | End: 2024-04-10

## 2024-04-09 RX ADMIN — OLANZAPINE 5 MG: 5 TABLET, ORALLY DISINTEGRATING ORAL at 08:04

## 2024-04-09 RX ADMIN — ACETAMINOPHEN 650 MG: 325 TABLET, FILM COATED ORAL at 02:04

## 2024-04-09 RX ADMIN — OLANZAPINE 20 MG: 10 TABLET, ORALLY DISINTEGRATING ORAL at 08:04

## 2024-04-09 RX ADMIN — DIVALPROEX SODIUM 1250 MG: 500 TABLET, FILM COATED, EXTENDED RELEASE ORAL at 08:04

## 2024-04-09 RX ADMIN — CLONAZEPAM 0.5 MG: 0.5 TABLET ORAL at 08:04

## 2024-04-09 RX ADMIN — TAMSULOSIN HYDROCHLORIDE 0.4 MG: 0.4 CAPSULE ORAL at 08:04

## 2024-04-09 NOTE — NURSING
"Daily Nursing Note:      Behavior:    Patient (Jayson Murcia is a 59 y.o. male, : 1965, MRN: [id number]) demonstrating an affect that was flat. Jayson demonstrating mood that is depressed. Jayson had an appearance that was clean. Jayson denies suicidal ideation. Jayson denies suicide plan. Jayson denies homicidal ideation. Jayson denies hallucinations.    Patient pacing the hallway, irritable, stated he wanted to call his  today because he wants to leave.  Pacing the hallway because he didn't want to "misbehave".  Patient stated, "they don't know me", "they're lying about me" ,"every body here here is talking about me".  Patient awake most of the night.  Security called to make rounds.    Amberlys  height is 5' 9" (1.753 m) and weight is 75.7 kg (166 lb 14.2 oz). His temperature is 98.4 °F (36.9 °C). His blood pressure is 127/87 and his pulse is 92. His respiration is 18 and oxygen saturation is 96%.     Jayson's last BM was noted on: _24______      Intervention:    Encourage Jayson to perform self-hygiene, grooming, and changing of clothing. Monitor Jayson's behavior and program compliance. Monitor Jayson for suicidal ideation, homicidal ideation, sleep disturbance, and hallucinations. Encourage Jayson to eat all portions of meals and assess for meal preferences. Monitor Jayson for intake and output to ensure hydration. Notify the Physician/Physician Assistant/Advance Practice Registered Nurse (MD/PA/APRN) for any medication refusal and any change in patient condition.      Response:    Jayson verbalizes understand of unit process and procedures. Jayson is compliant with meds.      Plan:     Continue to monitor per MD/PA/APRN orders; maintain patient safety.  "

## 2024-04-09 NOTE — PROGRESS NOTES
HD # 6    59-year-old white male who at this time continues to have difficulties in terms of overall irritability and poor frustration tolerance.  Today patient was has demonstrated once again some those behaviors with staff but ultimately did accept redirection.  Today with me in the nurse-practitioner he did fairly well.  He was fairly well able to self modulate his impulses and agitation.      Discussed with him how his previous impulsive agitated behaviors have not been beneficial for him.  He was able to acknowledge.  He just remains focused on discharge however.    He appears not to be sedate.  His gait was steady.      On mental status examination:  59-year-old white male who cooperates this time.  Whose speech was with good articulation and execution.  His thought processes this time more organized and linear.  Could at times become a mildly loosened under stress.  His thought content demonstrated no clear evidence of any visual hallucinations.  There was no active statements of any auditory hallucinations there continued to be some grandiosity, hyperreligiosity and some paranoia that continued to be present but appears to be somewhat lessened overall.  His insight and judgment deemed to be limited.    Impression:  Bipolar affective disorder most recent episode manic with psychotic features  Cocaine use disorder     Estimated continued hospitalization 48 hours     Indications: Patient this time presents ongoing challenges in terms of distortions and perceptions and mood but although improving has not reach full and complete benefit with current medication trials     Recommendations:  1. Continue trials of Depakote ER 1250 mg p.o. q.day.  2. Patient was reports ongoing difficulty with sleep last night so we will rearrange overall utilization of trials of Zyprexa to 20 mg p.o. q.h.s.   3. We will reduce overall doses of Klonopin 0.5 mg p.o. q.h.s..  We will attempt to eliminate Klonopin prior to discharge.   Certainly avoidance of any use of controlled substances in his high-risk person for substance use problems she will be done prior to discharge.  4.  We will staff tomorrow and look towards coordination of disposition planning.

## 2024-04-09 NOTE — GROUP NOTE
Education Group    Group Focus: Promoting Healthy Lifestyles by good personal hygiene      Number of patients in attendance: 3    Group Start Time: 1145  Group End Time:  1230  Groups Date: 4/9/2024  Group Topic:  Behavioral Health  Group Department: [facility]  Group Facilitators:  Jazmine Nayak LPN  _____________________________________________________________________    Patient Name: Jayson Murcia  MRN: [id number]  Patient Class: IP- Psych   Admission Date\Time: 4/3/2024  1:15 AM  Hospital Length of Stay: 6  Primary Care Provider: Casi Delgado MD     Referred by: Behavioral Medicine Unit Treatment Team     Target symptoms: Mood Disorder     Patient's response to treatment: Active Listening, Self-disclosure, Frequent Questions, and Feedback given to another patient     Progress toward goals: Progressing adequately     Interval History: Verbalized understanding. Good participation.      Diagnosis: bipolar      Plan: Continue treatment on BMU

## 2024-04-09 NOTE — NURSING
Pt accepted all meds as prescribed. Pt resting quietly in bed. Denies any SI/HI/AH/VH/Depression/Anxiety. Pt encouraged to report to staff any changes.

## 2024-04-09 NOTE — PLAN OF CARE
Problem: Violence Risk or Actual  Goal: Anger and Impulse Control  Outcome: Ongoing, Progressing  Intervention: Minimize Safety Risk  Flowsheets (Taken 4/9/2024 1139)  Behavior Management: impulse control promoted  Sensory Stimulation Regulation:   quiet environment promoted   lighting decreased     Problem: Adult Behavioral Health Plan of Care  Goal: Plan of Care Review  Outcome: Ongoing, Progressing  Goal: Patient-Specific Goal (Individualization)  Outcome: Ongoing, Progressing  Goal: Adheres to Safety Considerations for Self and Others  Outcome: Ongoing, Progressing  Intervention: Develop and Maintain Individualized Safety Plan  Flowsheets (Taken 4/9/2024 1139)  Safety Measures:   environmental rounds completed   safety rounds completed   suicide assessment completed  Goal: Absence of New-Onset Illness or Injury  Outcome: Ongoing, Progressing  Intervention: Identify and Manage Fall Risk  Flowsheets (Taken 4/9/2024 1139)  Safety Measures:   environmental rounds completed   safety rounds completed   suicide assessment completed  Intervention: Prevent Infection  Flowsheets (Taken 4/9/2024 1139)  Infection Prevention:   hand hygiene promoted   rest/sleep promoted  Goal: Optimized Coping Skills in Response to Life Stressors  Outcome: Ongoing, Progressing  Goal: Develops/Participates in Therapeutic [place] to Support Successful Transition  Outcome: Ongoing, Progressing  Intervention: Mutually Develop Transition Plan  Flowsheets (Taken 4/9/2024 1139)  Current Discharge Risk:   psychiatric illness   substance use/abuse  Outpatient/Agency/Support Group Needs:   outpatient counseling   outpatient medication management   outpatient psychiatric care (specify)   outpatient substance abuse treatment (specify)  Anticipated Discharge Disposition: home or self-care  Patient/Family Anticipated Services at Transition:   mental health services   outpatient care   rehabilitation services  Patient/Family Anticipates Transition to:  home  Concerns to be Addressed: denies needs/concerns at this time  Goal: Rounds/Family Conference  Outcome: Ongoing, Progressing     Problem: Activity and Energy Impairment (Excessive Substance Use)  Goal: Optimized Energy Level (Excessive Substance Use)  Outcome: Ongoing, Progressing  Intervention: Optimize Energy Level  Flowsheets (Taken 4/9/2024 1139)  Activity (Behavioral Health):   activity encouraged   up ad sonny  Diversional Activity:   art work   NanoGram   music   television     Problem: Behavior Regulation Impairment (Excessive Substance Use)  Goal: Improved Behavioral Control (Excessive Substance Use)  Outcome: Ongoing, Progressing  Intervention: Promote Behavior and Impulse Control  Flowsheets (Taken 4/9/2024 1139)  Behavior Management: impulse control promoted     Problem: Decreased Participation and Engagement (Excessive Substance Use)  Goal: Increased Participation and Engagement (Excessive Substance Use)  Outcome: Ongoing, Progressing  Intervention: Facilitate Participation and Engagement  Flowsheets (Taken 4/9/2024 1139)  Diversional Activity:   art work   NanoGram   music   television     Problem: Physiologic Impairment (Excessive Substance Use)  Goal: Improved Physiologic Symptoms (Excessive Substance Use)  Outcome: Ongoing, Progressing  Intervention: Optimize Physiologic Function  Flowsheets (Taken 4/9/2024 1139)  Complementary Therapy:   art therapy provided   music therapy provided     Problem: Social, Occupational or Functional Impairment (Excessive Substance Use)  Goal: Enhanced Social, Occupational or Functional Skills (Excessive Substance Use)  Outcome: Ongoing, Progressing     Problem: Fall Injury Risk  Goal: Absence of Fall and Fall-Related Injury  Outcome: Ongoing, Progressing  Intervention: Identify and Manage Contributors  Flowsheets (Taken 4/9/2024 1139)  Self-Care Promotion: independence encouraged  Medication Review/Management: medications reviewed  Intervention: Promote Injury-Free  Environment  Flowsheets (Taken 4/9/2024 1136)  Safety Promotion/Fall Prevention:   diversional activities provided   nonskid shoes/socks when out of bed

## 2024-04-09 NOTE — NURSING
"Daily Nursing Note:      Behavior:    Patient (Jayson Murcia is a 59 y.o. male, : 1965, MRN: [id number]) demonstrating an affect that was flat. Jayson demonstrating mood that is depressed and anxious. Jayson had an appearance that was clean. Jayson denies suicidal ideation. Jayson denies suicide plan. Jayson denies homicidal ideation. Jayson denies hallucinations.    Jayson's  height is 5' 9" (1.753 m) and weight is 75.7 kg (166 lb 14.2 oz). His temperature is 98 °F (36.7 °C). His blood pressure is 130/92 (abnormal) and his pulse is 86. His respiration is 18 and oxygen saturation is 96%.     Jayson's last BM was noted on: 2024. Jayson is quiet and withdrawn this morning.      Intervention:    Encourage Jayson to perform self-hygiene, grooming, and changing of clothing. Monitor Jayson's behavior and program compliance. Monitor Jayson for suicidal ideation, homicidal ideation, sleep disturbance, and hallucinations. Encourage Jayson to eat all portions of meals and assess for meal preferences. Monitor Jayson for intake and output to ensure hydration. Notify the Physician for any medication refusal and any change in patient condition.      Response:    Jayson verbalizes understand of unit process and procedures. Jayson has been compliant with medications.      Plan:     Continue to monitor per MD orders; maintain patient safety. Q15min safety checks. Assault precautions.  "

## 2024-04-09 NOTE — PROGRESS NOTES
Recreation Therapy Progress Note    Date:  4 9 2024     Time: 10:00 am     Group Title: leisure skills    Mood: less anxious     Behavior: participated in rt group    Affect: less anxious    Speech: talking more    Cognition: alert in group more    Participation Level: moderate level with prompts to do his best.    Intervention:cont rt services.          Recreation Therapist   Signature: lubna kidd MA CTRS

## 2024-04-09 NOTE — GROUP NOTE
Group Psychotherapy       Group Focus: Promoting Healthy Lifestyles      Number of patients in attendance: 7    Group Start Time: 0845  Group End Time:  0930  Groups Date: 4/9/2024  Group Topic:  Behavioral Health  Group Department: [facility]  Group Facilitators:  Frances Melchor RN  _____________________________________________________________________    Patient Name: Jayson Murcia  MRN: [id number]  Patient Class: IP- Psych   Admission Date\Time: 4/3/2024  1:15 AM  Hospital Length of Stay: 6  Primary Care Provider: Casi Delgado MD     Referred by: Behavioral Medicine Unit Treatment Team     Target symptoms: Mood Disorder and Psychosis     Patient's response to treatment: Active Listening     Progress toward goals: Progressing well     Interval History: Attended and participated in morning group     Diagnosis: Bipolar affective disorder, current episode manic with psychotic symptoms     Plan: Continue treatment on BMU

## 2024-04-10 PROCEDURE — 25000003 PHARM REV CODE 250: Performed by: PSYCHIATRY & NEUROLOGY

## 2024-04-10 PROCEDURE — 11400000 HC PSYCH PRIVATE ROOM

## 2024-04-10 RX ORDER — TAMSULOSIN HYDROCHLORIDE 0.4 MG/1
0.4 CAPSULE ORAL NIGHTLY
Qty: 30 CAPSULE | Refills: 0 | Status: SHIPPED | OUTPATIENT
Start: 2024-04-10

## 2024-04-10 RX ORDER — DIVALPROEX SODIUM 250 MG/1
1250 TABLET, FILM COATED, EXTENDED RELEASE ORAL NIGHTLY
Qty: 150 TABLET | Refills: 1 | Status: SHIPPED | OUTPATIENT
Start: 2024-04-10 | End: 2024-06-09

## 2024-04-10 RX ORDER — OLANZAPINE 10 MG/1
20 TABLET ORAL NIGHTLY
Status: DISCONTINUED | OUTPATIENT
Start: 2024-04-10 | End: 2024-04-11 | Stop reason: HOSPADM

## 2024-04-10 RX ORDER — OLANZAPINE 20 MG/1
20 TABLET ORAL NIGHTLY
Qty: 30 TABLET | Refills: 1 | Status: SHIPPED | OUTPATIENT
Start: 2024-04-10 | End: 2024-06-09

## 2024-04-10 RX ADMIN — TAMSULOSIN HYDROCHLORIDE 0.4 MG: 0.4 CAPSULE ORAL at 08:04

## 2024-04-10 RX ADMIN — DIVALPROEX SODIUM 1250 MG: 500 TABLET, FILM COATED, EXTENDED RELEASE ORAL at 08:04

## 2024-04-10 RX ADMIN — OLANZAPINE 20 MG: 10 TABLET, FILM COATED ORAL at 08:04

## 2024-04-10 NOTE — NURSING
"Daily Nursing Note:      Behavior:    Patient (Jayson Murcia is a 59 y.o. male, : 1965, MRN: [id number]) demonstrating an affect that was anxious. Jayson demonstrating mood that is anxious. Jayson had an appearance that was clean. Jayson denies suicidal ideation. Jayson denies suicide plan. Jayson denies homicidal ideation. Jayson denies hallucinations.    Jayson's  height is 5' 9" (1.753 m) and weight is 75.7 kg (166 lb 14.2 oz). His temperature is 98.4 °F (36.9 °C). His blood pressure is 145/93 (abnormal) and his pulse is 87. His respiration is 18 and oxygen saturation is 97%.     Amberlys last BM was noted on: 4/10/24      Intervention:    Encourage Jayson to perform self-hygiene, grooming, and changing of clothing. Monitor Jayson's behavior and program compliance. Monitor Jayson for suicidal ideation, homicidal ideation, sleep disturbance, and hallucinations. Encourage Jayson to eat all portions of meals and assess for meal preferences. Monitor Jayson for intake and output to ensure hydration. Notify the Physician for any medication refusal and any change in patient condition.      Response:    Jayson verbalizes understand of unit process and procedures. Jayson is compliant with medications, no adverse effects observed or reported.      Plan:     Continue to monitor per MD orders; maintain patient safety. Q 15 min safety checks. Will discharge in the morning and follow up with Reddy Ryan  "

## 2024-04-10 NOTE — PROGRESS NOTES
Recreation Therapy Progress Note    Date: 4 10 2024    Time:  10:00 a.m.    Group Title:  Creative expression    Mood:  Anxious isolated and withdrawn this morning    Behavior:  Patient came to the last.  15 minutes .  Patient stated he wanted to sleep.  Patient did the last cognitive game with peers with prompts from RT staff.    Affect:  Isolated and quiet today also anxious and focused on discharge    Speech:  Patient quiet today    Cognition:  Patient alert but focused on discharge and seems to be preoccupied.     Participation Level:  Patient participate in the last 15 minutes group with prompts from RT staff    Intervention:  Continue RT services          Recreation Therapist   Signature:  Anna Calvillo ma ctrs

## 2024-04-10 NOTE — PROGRESS NOTES
LACEY # 7    51-year-old white male who at this time is seen interviewed today.  Patient was appears to be showing improvement in terms of range of affect and overall spontaneity.  Patient was discussed in full treatment team today interval history in the past week was discussed.  Patient was had no further episodes of aggression or agitation towards others has been able to accept redirection at this point in time.  Patient was had no real difficulties in terms of management with current medications.      Patient was this time has shown evidence of improvement in terms of impulse control and does not appear to be as irritable.  He understands the importance of adherence to medications and hopefully able to do so moving forward.      On mental status examination:  59-year-old white male who is far more appropriate range of affect.  Whose speech was good articulation and execution.  His thought processes this time were linear.  His thought content demonstrated no clear evidence of any auditory or visual hallucinations.  Patient was making no active statements to harm self.  Patient was making no active statements to harm others.  He was level intensity overall grandiosity appears to be far lessened.  He was not show any paranoia or suspiciousness.  His insight and judgment deemed adequate for integration to outpatient care     Impression:  1. Continue with trials of Zyprexa 20 mg p.o. q.h.s.   2.  Continue trials of Depakote ER 1250 mg p.o. q.h.s.   3. We will formerly stop clonazepam this evening   4. Anticipate discharge to outpatient level of care in the next 24 hours.

## 2024-04-10 NOTE — GROUP NOTE
Group Psychotherapy       Group Focus: Psychodynamic Group Psychotherapy      Number of patients in attendance: 9    Group Start Time: 0900  Group End Time:  0945  Groups Date: 4/10/2024  Group Topic:  Behavioral Health  Group Department: [facility]  Group Facilitators:  Jorge Wilson LCSW  _____________________________________________________________________    Patient Name: Jayson Murcia  MRN: [id number]  Patient Class: IP- Psych   Admission Date\Time: 4/3/2024  1:15 AM  Hospital Length of Stay: 7  Primary Care Provider: Casi Delgado MD     Referred by: Behavioral Medicine Unit Treatment Team     Target symptoms: Mood Disorder     Patient's response to treatment: Active Listening and Self-disclosure     Progress toward goals: Progressing adequately     Interval History: Group discussed moving forward and making better choices.     Diagnosis:      Plan: Continue treatment on BMU

## 2024-04-10 NOTE — PROGRESS NOTES
Recreation Therapy Progress Note    Date: 4  10  2024    Time: 1400    Group Title: leisure skills    Mood: hopeful  and less anxious    Behavior: participated better today and more calm    Affect: more calm and less anxious    Speech: talking more today    Cognition: alert in class    Participation Level: 100     Intervention:pt has completed rt assignments in tx plan at 90%          Recreation Therapist   Signature: lubna Calvillo ma ctrs.

## 2024-04-10 NOTE — PROGRESS NOTES
"Inpatient Nutrition Evaluation    Admit Date: 4/3/2024   Total duration of encounter: 7 days   Patient Age: 59 y.o.    Nutrition Recommendation/Prescription     Continue Regular/Double Portions diet as tolerated.  Weigh weekly    Nutrition Assessment     Chart Review    Reason Seen: length of stay    Malnutrition Screening Tool Results              Diagnosis:  Bipolar affective d/o, current episode, manic with psychotic symptoms    Relevant Medical History:   Urinary incontinence, joint pain, sleep difficulties, substance abuse, PTSD, bipolar d/o, merlyn    Scheduled Medications:  clonazePAM, 0.5 mg, Nightly  divalproex ER, 1,250 mg, Daily  OLANZapine zydis, 20 mg, QHS  tamsulosin, 0.4 mg, QHS    Continuous Infusions:   PRN Medications: acetaminophen, aluminum-magnesium hydroxide-simethicone, hydrOXYzine pamoate, LORazepam, OLANZapine zydis    Recent Labs   Lab 24  0440   TRIG 149*     Nutrition Orders:  Diet Adult Regular Double Portions; PEC/CEC - Styrofoam      Appetite/Oral Intake: good/% of meals  Factors Affecting Nutritional Intake: none identified  Food/Gnosticist/Cultural Preferences: none reported  Food Allergies: none reported  Last Bowel Movement: 24  Wound(s):  Intact    Comments    (4/10) Pt with good appetite and intake. Consumes 100% of meals and snacks. No reports N/V/C/D. No difficulties chewing or swallowing. Able to feed self. UBW per #     Anthropometrics    Height: 5' 9" (175.3 cm),    Last Weight: 75.7 kg (166 lb 14.2 oz) (24 0636), Weight Method: Standard Scale  BMI (Calculated): 24.6  BMI Classification: normal (BMI 18.5-24.9)        Ideal Body Weight (IBW), Male: 160 lb     % Ideal Body Weight, Male (lb): 103.48 %                 Usual Body Weight (UBW), k kg  % Usual Body Weight: 101.14     Usual Weight Provided By: EMR weight history    Wt Readings from Last 5 Encounters:   24 75.7 kg (166 lb 14.2 oz)   24 74.8 kg (165 lb)   23 76.5 kg " (168 lb 9.6 oz)   03/15/23 75.4 kg (166 lb 3.6 oz)   09/07/22 77.2 kg (170 lb 3.2 oz)     Weight Change(s) Since Admission: gain of 1# in one week  Wt Readings from Last 1 Encounters:   04/07/24 0636 75.7 kg (166 lb 14.2 oz)   04/03/24 0115 75.1 kg (165 lb 9.1 oz)   Admit Weight: 75.1 kg (165 lb 9.1 oz) (04/03/24 0115), Weight Method: Standard Scale        Nutrition Goals & Monitoring     Dietitian will monitor: food and beverage intake and weight    Nutrition Risk/Follow-Up: low (follow-up in 5-7 days)  Patients assigned 'low nutrition risk' status do not qualify for a full nutritional assessment but will be monitored and re-evaluated in a 5-7 day time period. Please consult if re-evaluation needed sooner.

## 2024-04-10 NOTE — PLAN OF CARE
Problem: Violence Risk or Actual  Goal: Anger and Impulse Control  Outcome: Met     Problem: Adult Behavioral Health Plan of Care  Goal: Plan of Care Review  Outcome: Ongoing, Progressing  Goal: Optimized Coping Skills in Response to Life Stressors  Outcome: Ongoing, Progressing  Intervention: Promote Effective Coping Strategies  Flowsheets (Taken 4/10/2024 1801)  Supportive Measures:   active listening utilized   decision-making supported   goal-setting facilitated   positive reinforcement provided   self-care encouraged   self-responsibility promoted   verbalization of feelings encouraged     Problem: Activity and Energy Impairment (Excessive Substance Use)  Goal: Optimized Energy Level (Excessive Substance Use)  Outcome: Ongoing, Progressing  Intervention: Optimize Energy Level  Flowsheets (Taken 4/10/2024 1801)  Activity (Behavioral Health): activity encouraged     Problem: Behavior Regulation Impairment (Excessive Substance Use)  Goal: Improved Behavioral Control (Excessive Substance Use)  Outcome: Ongoing, Progressing  Intervention: Promote Behavior and Impulse Control  Flowsheets (Taken 4/10/2024 1801)  Behavior Management: impulse control promoted     Problem: Decreased Participation and Engagement (Excessive Substance Use)  Goal: Increased Participation and Engagement (Excessive Substance Use)  Outcome: Ongoing, Progressing  Intervention: Facilitate Participation and Engagement  Flowsheets (Taken 4/10/2024 1801)  Supportive Measures:   active listening utilized   decision-making supported   goal-setting facilitated   positive reinforcement provided   self-care encouraged   self-responsibility promoted   verbalization of feelings encouraged     Problem: Social, Occupational or Functional Impairment (Excessive Substance Use)  Goal: Enhanced Social, Occupational or Functional Skills (Excessive Substance Use)  Outcome: Ongoing, Progressing  Intervention: Promote Social, Occupational and Functional  Ability  Flowsheets (Taken 4/10/2024 1801)  Trust Relationship/Rapport:   emotional support provided   empathic listening provided   thoughts/feelings acknowledged  Social Functional Ability Promotion:   self-expression encouraged   social interaction promoted     Problem: Fall Injury Risk  Goal: Absence of Fall and Fall-Related Injury  Outcome: Ongoing, Progressing  Intervention: Identify and Manage Contributors  Flowsheets (Taken 4/10/2024 1801)  Self-Care Promotion: independence encouraged  Intervention: Promote Injury-Free Environment  Flowsheets (Taken 4/10/2024 1801)  Safety Promotion/Fall Prevention: nonskid shoes/socks when out of bed

## 2024-04-10 NOTE — NURSING
"Daily Nursing Note:      Behavior:    Patient (Jayson Murcia is a 59 y.o. male, : 1965, MRN: [id number]) demonstrating an affect that was flat. Jayson demonstrating mood that is depressed and anxious. Jayson had an appearance that was clean. Jayson denies suicidal ideation. Jayson denies suicide plan. Jayson denies homicidal ideation. Jayson denies hallucinations.    Jayson's  height is 5' 9" (1.753 m) and weight is 75.7 kg (166 lb 14.2 oz). His temperature is 98 °F (36.7 °C). His blood pressure is 143/95 (abnormal) and his pulse is 100. His respiration is 18 and oxygen saturation is 96%.     Amberlys last BM was noted on: _24______      Intervention:    Encourage Jayson to perform self-hygiene, grooming, and changing of clothing. Monitor Jayson's behavior and program compliance. Monitor Jayson for suicidal ideation, homicidal ideation, sleep disturbance, and hallucinations. Encourage Jayson to eat all portions of meals and assess for meal preferences. Monitor Jayson for intake and output to ensure hydration. Notify the Physician/Physician Assistant/Advance Practice Registered Nurse (MD/PA/APRN) for any medication refusal and any change in patient condition.      Response:    Jayson verbalizes understand of unit process and procedures. Jayson is med compliant.      Plan:     Continue to monitor per MD/PA/APRN orders; maintain patient safety.  "

## 2024-04-11 VITALS
TEMPERATURE: 98 F | RESPIRATION RATE: 16 BRPM | DIASTOLIC BLOOD PRESSURE: 80 MMHG | WEIGHT: 166.88 LBS | SYSTOLIC BLOOD PRESSURE: 115 MMHG | BODY MASS INDEX: 24.72 KG/M2 | OXYGEN SATURATION: 96 % | HEART RATE: 99 BPM | HEIGHT: 69 IN

## 2024-04-11 NOTE — NURSING
Discharge Note:    Jayson Murcia is a 59 y.o. male, : 1965, MRN: [id number], admitted on 4/3/2024 for Faisal Gonzales MD with a diagnosis of Psychosis [F29].    Patient discharged homed via Medicaid transport in stable condition. Patient denied suicidal ideation, homicidal ideation, or hallucinations.  Patient medication compliant with no c/o side effects.   Patient was discharged with valuables, personal belongings, prescriptions, discharge instructions, and an educational handout explaining the diagnosis and prescribed medications. Patient verbalized understanding of the discharge instructions and importance of follow-up visits.     Patient discharged on the following medications:     Medication List        START taking these medications      divalproex  MG 24 hr tablet  Commonly known as: DEPAKOTE ER  Take 5 tablets (1,250 mg total) by mouth every evening.     OLANZapine 20 MG tablet  Commonly known as: ZyPREXA  Take 1 tablet (20 mg total) by mouth every evening.            CHANGE how you take these medications      tamsulosin 0.4 mg Cap  Commonly known as: FLOMAX  Take 1 capsule (0.4 mg total) by mouth every evening.  What changed: when to take this            STOP taking these medications      baclofen 10 MG tablet  Commonly known as: LIORESAL            ASK your doctor about these medications      HYDROcodone-acetaminophen 7.5-325 mg per tablet  Commonly known as: NORCO  Take 1 tablet by mouth every 6 (six) hours as needed for Pain.               Where to Get Your Medications        These medications were sent to [id number] - [place] - [address] AT [facility] [address]  [address], [place] [address]      Phone: [phone]   divalproex  MG 24 hr tablet  OLANZapine 20 MG tablet  tamsulosin 0.4 mg Cap
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
